# Patient Record
Sex: MALE | Race: WHITE | NOT HISPANIC OR LATINO | Employment: FULL TIME | URBAN - METROPOLITAN AREA
[De-identification: names, ages, dates, MRNs, and addresses within clinical notes are randomized per-mention and may not be internally consistent; named-entity substitution may affect disease eponyms.]

---

## 2017-08-31 ENCOUNTER — TRANSFERRED RECORDS (OUTPATIENT)
Dept: HEALTH INFORMATION MANAGEMENT | Facility: CLINIC | Age: 37
End: 2017-08-31

## 2017-08-31 LAB
CREAT SERPL-MCNC: 1.1 MG/DL (ref 0.6–1.3)
GLUCOSE SERPL-MCNC: 124 MG/DL (ref 60–115)
POTASSIUM SERPL-SCNC: 3.8 MMOL/L (ref 3.5–4.9)

## 2019-01-15 ENCOUNTER — OFFICE VISIT (OUTPATIENT)
Dept: FAMILY MEDICINE | Facility: CLINIC | Age: 39
End: 2019-01-15
Payer: COMMERCIAL

## 2019-01-15 VITALS
HEART RATE: 78 BPM | OXYGEN SATURATION: 96 % | TEMPERATURE: 98.2 F | BODY MASS INDEX: 37.18 KG/M2 | RESPIRATION RATE: 16 BRPM | WEIGHT: 251 LBS | HEIGHT: 69 IN | DIASTOLIC BLOOD PRESSURE: 88 MMHG | SYSTOLIC BLOOD PRESSURE: 126 MMHG

## 2019-01-15 DIAGNOSIS — J45.40 MODERATE PERSISTENT ASTHMA WITHOUT COMPLICATION: Primary | ICD-10-CM

## 2019-01-15 PROCEDURE — 99203 OFFICE O/P NEW LOW 30 MIN: CPT | Performed by: PHYSICIAN ASSISTANT

## 2019-01-15 RX ORDER — LEVALBUTEROL TARTRATE 45 UG/1
2 AEROSOL, METERED ORAL EVERY 4 HOURS PRN
COMMUNITY
End: 2022-04-19

## 2019-01-15 RX ORDER — ALBUTEROL SULFATE 90 UG/1
1-2 AEROSOL, METERED RESPIRATORY (INHALATION) EVERY 4 HOURS PRN
Qty: 1 INHALER | Refills: 3 | Status: SHIPPED | OUTPATIENT
Start: 2019-01-15 | End: 2019-03-01

## 2019-01-15 ASSESSMENT — MIFFLIN-ST. JEOR: SCORE: 2038.91

## 2019-01-15 NOTE — LETTER
My Asthma Action Plan  Name: Mario Delgado   YOB: 1980  Date: 1/15/2019   My doctor: Mira Arboleda PA-C   My clinic: [facility]        My Control Medicine:  My Rescue Medicine:    My Asthma Severity:   Avoid your asthma triggers:                GREEN ZONE   Good Control    I feel good    No cough or wheeze    Can work, sleep and play without asthma symptoms       Take your asthma control medicine every day.     1. If exercise triggers your asthma, take your rescue medication    15 minutes before exercise or sports, and    During exercise if you have asthma symptoms  2. Spacer to use with inhaler: If you have a spacer, make sure to use it with your inhaler             YELLOW ZONE Getting Worse  I have ANY of these:    I do not feel good    Cough or wheeze    Chest feels tight    Wake up at night   1. Keep taking your Green Zone medications  2. Start taking your rescue medicine:    every 20 minutes for up to 1 hour. Then every 4 hours for 24-48 hours.  3. If you stay in the Yellow Zone for more than 12-24 hours, contact your doctor.  4. If you do not return to the Green Zone in 12-24 hours or you get worse, start taking your oral steroid medicine if prescribed by your provider.           RED ZONE Medical Alert - Get Help  I have ANY of these:    I feel awful    Medicine is not helping    Breathing getting harder    Trouble walking or talking    Nose opens wide to breathe       1. Take your rescue medicine NOW  2. If your provider has prescribed an oral steroid medicine, start taking it NOW  3. Call your doctor NOW  4. If you are still in the Red Zone after 20 minutes and you have not reached your doctor:    Take your rescue medicine again and    Call 911 or go to the emergency room right away    See your regular doctor within 2 weeks of an Emergency Room or Urgent Care visit for follow-up treatment.          Annual Reminders:  Meet with Asthma Educator,  Flu Shot in the Fall,  consider Pneumonia Vaccination for patients with asthma (aged 19 and older).    Pharmacy:    [facility] [address] [place], [place] - [address] AT [facility] [address]  [facility] [address] [place], [place] - [address] AT [facility] [address]                      Asthma Triggers  How To Control Things That Make Your Asthma Worse    Triggers are things that make your asthma worse.  Look at the list below to help you find your triggers and what you can do about them.  You can help prevent asthma flare-ups by staying away from your triggers.      Trigger                                                          What you can do   Cigarette Smoke  Tobacco smoke can make asthma worse. Do not allow smoking in your home, car or around you.  Be sure no one smokes at a child s day care or school.  If you smoke, ask your health care provider for ways to help you quit.  Ask family members to quit too.  Ask your health care provider for a referral to Quit Plan to help you quit smoking, or call 2-833-420-PLAN.     Colds, Flu, Bronchitis  These are common triggers of asthma. Wash your hands often.  Don t touch your eyes, nose or mouth.  Get a flu shot every year.     Dust Mites  These are tiny bugs that live in cloth or carpet. They are too small to see. Wash sheets and blankets in hot water every week.   Encase pillows and mattress in dust mite proof covers.  Avoid having carpet if you can. If you have carpet, vacuum weekly.   Use a dust mask and HEPA vacuum.   Pollen and Outdoor Mold  Some people are allergic to trees, grass, or weed pollen, or molds. Try to keep your windows closed.  Limit time out doors when pollen count is high.   Ask you health care provider about taking medicine during allergy season.     Animal Dander  Some people are allergic to skin flakes, urine or saliva from pets with fur or feathers. Keep pets with fur or feathers out of your home.    If you can t keep the pet  outdoors, then keep the pet out of your bedroom.  Keep the bedroom door closed.  Keep pets off cloth furniture and away from stuffed toys.     Mice, Rats, and Cockroaches  Some people are allergic to the waste from these pests.   Cover food and garbage.  Clean up spills and food crumbs.  Store grease in the refrigerator.   Keep food out of the bedroom.   Indoor Mold  This can be a trigger if your home has high moisture. Fix leaking faucets, pipes, or other sources of water.   Clean moldy surfaces.  Dehumidify basement if it is damp and smelly.   Smoke, Strong Odors, and Sprays  These can reduce air quality. Stay away from strong odors and sprays, such as perfume, powder, hair spray, paints, smoke incense, paint, cleaning products, candles and new carpet.   Exercise or Sports  Some people with asthma have this trigger. Be active!  Ask your doctor about taking medicine before sports or exercise to prevent symptoms.    Warm up for 5-10 minutes before and after sports or exercise.     Other Triggers of Asthma  Cold air:  Cover your nose and mouth with a scarf.  Sometimes laughing or crying can be a trigger.  Some medicines and food can trigger asthma.

## 2019-01-16 ASSESSMENT — ASTHMA QUESTIONNAIRES: ACT_TOTALSCORE: 12

## 2019-02-27 ENCOUNTER — MYC MEDICAL ADVICE (OUTPATIENT)
Dept: FAMILY MEDICINE | Facility: CLINIC | Age: 39
End: 2019-02-27

## 2019-02-27 DIAGNOSIS — J45.40 MODERATE PERSISTENT ASTHMA WITHOUT COMPLICATION: ICD-10-CM

## 2019-02-28 NOTE — TELEPHONE ENCOUNTER
Disp Refills Start End TANI   albuterol (PROAIR HFA/PROVENTIL HFA/VENTOLIN HFA) 108 (90 Base) MCG/ACT inhaler 1 Inhaler 3 1/15/2019 1/15/2020 --   Sig - Route: Inhale 1-2 puffs into the lungs every 4 hours as needed for shortness of breath / dyspnea or wheezing - Inhalation

## 2019-03-01 RX ORDER — ALBUTEROL SULFATE 90 UG/1
1-2 AEROSOL, METERED RESPIRATORY (INHALATION) EVERY 4 HOURS PRN
Qty: 2 INHALER | Refills: 3 | Status: SHIPPED | OUTPATIENT
Start: 2019-03-01 | End: 2020-03-28

## 2019-03-01 NOTE — TELEPHONE ENCOUNTER
Pt is requesting quantity change from one to two albuterol inhalers per month.  Forwarded to PCP.   Carlos A Martin RN

## 2019-09-30 ENCOUNTER — HEALTH MAINTENANCE LETTER (OUTPATIENT)
Age: 39
End: 2019-09-30

## 2020-03-27 DIAGNOSIS — J45.40 MODERATE PERSISTENT ASTHMA WITHOUT COMPLICATION: ICD-10-CM

## 2020-03-28 NOTE — TELEPHONE ENCOUNTER
Routing refill request to provider for review/approval because:  Patient needs to be seen because it has been more than 1 year since last office visit 1/15/2019  ACT not up to date     Pending Prescriptions:                       Disp   Refills    VENTOLIN  (90 Base) MCG/ACT inhale*36 g                Sig: INHALE 1 TO 2 PUFFS INTO THE LUNGS EVERY 4 HOURS           AS NEEDED FOR SHORTNESS OF BREATH OR DIFFICULT           BREATHING OR WHEEZING      Shaylee Diaz, Registered Nurse   [facility]

## 2020-03-30 RX ORDER — ALBUTEROL SULFATE 90 UG/1
AEROSOL, METERED RESPIRATORY (INHALATION)
Qty: 18 G | Refills: 0 | Status: SHIPPED | OUTPATIENT
Start: 2020-03-30 | End: 2020-05-07

## 2020-05-05 DIAGNOSIS — J45.40 MODERATE PERSISTENT ASTHMA WITHOUT COMPLICATION: ICD-10-CM

## 2020-05-05 RX ORDER — ALBUTEROL SULFATE 90 UG/1
AEROSOL, METERED RESPIRATORY (INHALATION)
Qty: 18 G | Refills: 0 | Status: CANCELLED | OUTPATIENT
Start: 2020-05-05

## 2020-05-07 RX ORDER — ALBUTEROL SULFATE 90 UG/1
AEROSOL, METERED RESPIRATORY (INHALATION)
Qty: 18 G | Refills: 0 | Status: SHIPPED | OUTPATIENT
Start: 2020-05-07 | End: 2020-05-12

## 2020-05-07 NOTE — TELEPHONE ENCOUNTER
Called pt-lm to call clinic, video/phone visit needed for med ck with Mira Arboleda.    Yola Ferrer/MARTINA

## 2020-05-07 NOTE — TELEPHONE ENCOUNTER
Routing refill request to provider for review/approval because:  Abnormal ACT    Ann Marie Shore RN on 5/7/2020 at 10:12 AM

## 2020-05-12 ENCOUNTER — VIRTUAL VISIT (OUTPATIENT)
Dept: FAMILY MEDICINE | Facility: CLINIC | Age: 40
End: 2020-05-12
Payer: COMMERCIAL

## 2020-05-12 ENCOUNTER — TELEPHONE (OUTPATIENT)
Dept: FAMILY MEDICINE | Facility: CLINIC | Age: 40
End: 2020-05-12

## 2020-05-12 DIAGNOSIS — J45.40 MODERATE PERSISTENT ASTHMA WITHOUT COMPLICATION: ICD-10-CM

## 2020-05-12 PROCEDURE — 99213 OFFICE O/P EST LOW 20 MIN: CPT | Mod: GT | Performed by: PHYSICIAN ASSISTANT

## 2020-05-12 RX ORDER — ALBUTEROL SULFATE 90 UG/1
AEROSOL, METERED RESPIRATORY (INHALATION)
Qty: 54 G | Refills: 1 | Status: SHIPPED | OUTPATIENT
Start: 2020-05-12 | End: 2020-11-16

## 2020-05-12 ASSESSMENT — ASTHMA QUESTIONNAIRES
QUESTION_4 LAST FOUR WEEKS HOW OFTEN HAVE YOU USED YOUR RESCUE INHALER OR NEBULIZER MEDICATION (SUCH AS ALBUTEROL): THREE OR MORE TIMES PER DAY
QUESTION_3 LAST FOUR WEEKS HOW OFTEN DID YOUR ASTHMA SYMPTOMS (WHEEZING, COUGHING, SHORTNESS OF BREATH, CHEST TIGHTNESS OR PAIN) WAKE YOU UP AT NIGHT OR EARLIER THAN USUAL IN THE MORNING: ONCE OR TWICE
QUESTION_2 LAST FOUR WEEKS HOW OFTEN HAVE YOU HAD SHORTNESS OF BREATH: MORE THAN ONCE A DAY
ACT_TOTALSCORE: 12
QUESTION_5 LAST FOUR WEEKS HOW WOULD YOU RATE YOUR ASTHMA CONTROL: SOMEWHAT CONTROLLED
QUESTION_1 LAST FOUR WEEKS HOW MUCH OF THE TIME DID YOUR ASTHMA KEEP YOU FROM GETTING AS MUCH DONE AT WORK, SCHOOL OR AT HOME: SOME OF THE TIME

## 2020-05-12 NOTE — PROGRESS NOTES
"Mario Delgado is a 39 year old male who is being evaluated via a billable video visit.      The patient has been notified of following:     \"This video visit will be conducted via a call between you and your physician/provider. We have found that certain health care needs can be provided without the need for an in-person physical exam.  This service lets us provide the care you need with a video conversation.  If a prescription is necessary we can send it directly to your pharmacy.  If lab work is needed we can place an order for that and you can then stop by our lab to have the test done at a later time.    Video visits are billed at different rates depending on your insurance coverage.  Please reach out to your insurance provider with any questions.    If during the course of the call the physician/provider feels a video visit is not appropriate, you will not be charged for this service.\"    Patient has given verbal consent for Video visit? Yes    How would you like to obtain your AVS? [facility]    Patient would like the video invitation sent by: Text to cell phone: [phone]    Will anyone else be joining your video visit? No      Subjective     Mario Delgado is a 39 year old male who presents today via video visit for the following health issues:    HPI  Asthma Follow-Up  Patient is not using Advair due to cost.    Was ACT completed today?    Yes    ACT Total Scores 5/12/2020   ACT TOTAL SCORE -   ASTHMA ER VISITS -   ASTHMA HOSPITALIZATIONS -   ACT TOTAL SCORE (Goal Greater than or Equal to 20) 12   In the past 12 months, how many times did you visit the emergency room for your asthma without being admitted to the hospital? 0   In the past 12 months, how many times were you hospitalized overnight because of your asthma? 0       How many days per week do you miss taking your asthma controller medication?  0    Please describe any recent triggers for your asthma: pollens, animal dander and saw dust    Have " "you had any Emergency Room Visits, Urgent Care Visits, or Hospital Admissions since your last office visit?  No      How many servings of fruits and vegetables do you eat daily?  0-1    On average, how many sweetened beverages do you drink each day (Examples: soda, juice, sweet tea, etc.  Do NOT count diet or artificially sweetened beverages)?   2    How many days per week do you exercise enough to make your heart beat faster? 3 or less    How many minutes a day do you exercise enough to make your heart beat faster? 30 - 60    How many days per week do you miss taking your medication? 0         Video Start Time: 1103        Patient Active Problem List   Diagnosis     CARDIOVASCULAR SCREENING; LDL GOAL LESS THAN 160     Seasonal allergic rhinitis     Moderate persistent asthma     Past Surgical History:   Procedure Laterality Date     ENT SURGERY      Nasal Fracture     NO HISTORY OF SURGERY         Social History     Tobacco Use     Smoking status: Never Smoker     Smokeless tobacco: Current User     Types: Chew   Substance Use Topics     Alcohol use: Yes     Comment: 3-4 drinks 1-2x/month     Family History   Problem Relation Age of Onset     Asthma Mother      Family History Negative No family hx of            Reviewed and updated as needed this visit by Provider         Review of Systems   Constitutional, HEENT, cardiovascular, pulmonary, gi and gu systems are negative, except as otherwise noted.      Objective    There were no vitals taken for this visit.  Estimated body mass index is 37.07 kg/m  as calculated from the following:    Height as of 1/15/19: 1.753 m (5' 9\").    Weight as of 1/15/19: 113.9 kg (251 lb).  Physical Exam     GENERAL: Healthy, alert and no distress  EYES: Eyes grossly normal to inspection.  No discharge or erythema, or obvious scleral/conjunctival abnormalities.  RESP: No audible wheeze, cough, or visible cyanosis.  No visible retractions or increased work of breathing.    SKIN: Visible " skin clear. No significant rash, abnormal pigmentation or lesions.  NEURO: Cranial nerves grossly intact.  Mentation and speech appropriate for age.  PSYCH: Mentation appears normal, affect normal/bright, judgement and insight intact, normal speech and appearance well-groomed.              Assessment & Plan     1. Moderate persistent asthma without complication  Poorly controlled. Recommend restart Advair.  Discussed [facility] for cost. Pt will look into this.     - fluticasone-salmeterol (ADVAIR DISKUS) 500-50 MCG/DOSE inhaler; Inhale 1 puff into the lungs 2 times daily  Dispense: 3 Inhaler; Refill: 3  - albuterol (PROAIR HFA/PROVENTIL HFA/VENTOLIN HFA) 108 (90 Base) MCG/ACT inhaler; INHALE 3 PUFFS INTO THE LUNGS EVERY 4 HOURS AS NEEDED FOR SHORTNESS OF BREATH/DYSPNEA OR WHEEZING  Dispense: 54 g; Refill: 1     Tobacco Cessation:   reports that he has never smoked. His smokeless tobacco use includes chew.  Tobacco Cessation Action Plan: Information offered: Patient not interested at this time        See Patient Instructions    No follow-ups on file.    Mira Arboleda PA-C  [facility]      Video-Visit Details    Type of service:  Video Visit    Video End Time:1115    Originating Location (pt. Location): Home    Distant Location (provider location):  [facility]     Platform used for Video Visit: IQMax    No follow-ups on file.       Mira Arboleda PA-C

## 2020-05-13 ASSESSMENT — ASTHMA QUESTIONNAIRES: ACT_TOTALSCORE: 12

## 2020-10-18 ENCOUNTER — OFFICE VISIT (OUTPATIENT)
Dept: URGENT CARE | Facility: URGENT CARE | Age: 40
End: 2020-10-18
Payer: COMMERCIAL

## 2020-10-18 ENCOUNTER — ANCILLARY PROCEDURE (OUTPATIENT)
Dept: GENERAL RADIOLOGY | Facility: CLINIC | Age: 40
End: 2020-10-18
Attending: FAMILY MEDICINE
Payer: COMMERCIAL

## 2020-10-18 VITALS
RESPIRATION RATE: 16 BRPM | DIASTOLIC BLOOD PRESSURE: 78 MMHG | TEMPERATURE: 99.1 F | HEART RATE: 104 BPM | SYSTOLIC BLOOD PRESSURE: 110 MMHG | OXYGEN SATURATION: 96 %

## 2020-10-18 DIAGNOSIS — R07.9 CHEST PAIN, UNSPECIFIED TYPE: Primary | ICD-10-CM

## 2020-10-18 LAB
BASOPHILS # BLD AUTO: 0 10E9/L (ref 0–0.2)
BASOPHILS NFR BLD AUTO: 0.2 %
DIFFERENTIAL METHOD BLD: NORMAL
EOSINOPHIL # BLD AUTO: 0 10E9/L (ref 0–0.7)
EOSINOPHIL NFR BLD AUTO: 0 %
ERYTHROCYTE [DISTWIDTH] IN BLOOD BY AUTOMATED COUNT: 13.2 % (ref 10–15)
HCT VFR BLD AUTO: 46.2 % (ref 40–53)
HGB BLD-MCNC: 15.8 G/DL (ref 13.3–17.7)
LYMPHOCYTES # BLD AUTO: 1 10E9/L (ref 0.8–5.3)
LYMPHOCYTES NFR BLD AUTO: 21.2 %
MCH RBC QN AUTO: 30.9 PG (ref 26.5–33)
MCHC RBC AUTO-ENTMCNC: 34.2 G/DL (ref 31.5–36.5)
MCV RBC AUTO: 90 FL (ref 78–100)
MONOCYTES # BLD AUTO: 0.7 10E9/L (ref 0–1.3)
MONOCYTES NFR BLD AUTO: 14.4 %
NEUTROPHILS # BLD AUTO: 3.1 10E9/L (ref 1.6–8.3)
NEUTROPHILS NFR BLD AUTO: 64.2 %
PLATELET # BLD AUTO: 187 10E9/L (ref 150–450)
RBC # BLD AUTO: 5.12 10E12/L (ref 4.4–5.9)
WBC # BLD AUTO: 4.9 10E9/L (ref 4–11)

## 2020-10-18 PROCEDURE — 93000 ELECTROCARDIOGRAM COMPLETE: CPT | Performed by: FAMILY MEDICINE

## 2020-10-18 PROCEDURE — 71046 X-RAY EXAM CHEST 2 VIEWS: CPT | Performed by: RADIOLOGY

## 2020-10-18 PROCEDURE — 99214 OFFICE O/P EST MOD 30 MIN: CPT | Performed by: FAMILY MEDICINE

## 2020-10-18 PROCEDURE — 36415 COLL VENOUS BLD VENIPUNCTURE: CPT | Performed by: FAMILY MEDICINE

## 2020-10-18 PROCEDURE — 85025 COMPLETE CBC W/AUTO DIFF WBC: CPT | Performed by: FAMILY MEDICINE

## 2020-10-18 NOTE — PROGRESS NOTES
SUBJECTIVE:   Mario Delgado is a 40 year old male presenting with a chief complaint of   Chief Complaint   Patient presents with     Urgent Care     Chest Pain     Left side of chest-Pain-started last week on and off-Pain radiates into upper back by shoulder blades   Big party weekend. He is concerned about his heart . Had a relative die of a heart attack and so he is very concerned about his heart.  He is having some chest pain.  This chest pain is in the middle of his chest without radiation.  It without any evidence of shortness of breath.  It does come with deep inspiration.    He has no signs or symptoms of CHF.      He is an established patient of [facility].        Review of Systems   Respiratory:        ;l;[facility]   Cardiovascular: Positive for chest pain.   All other systems reviewed and are negative.      Past Medical History:   Diagnosis Date     Mild intermittent asthma      Family History   Problem Relation Age of Onset     Asthma Mother      Family History Negative No family hx of      Current Outpatient Medications   Medication Sig Dispense Refill     albuterol (PROAIR HFA/PROVENTIL HFA/VENTOLIN HFA) 108 (90 Base) MCG/ACT inhaler INHALE 3 PUFFS INTO THE LUNGS EVERY 4 HOURS AS NEEDED FOR SHORTNESS OF BREATH/DYSPNEA OR WHEEZING 54 g 1     fluticasone-salmeterol (ADVAIR DISKUS) 500-50 MCG/DOSE inhaler Inhale 1 puff into the lungs 2 times daily 3 Inhaler 3     levalbuterol (XOPENEX HFA) 45 MCG/ACT inhaler Inhale 2 puffs into the lungs every 4 hours as needed for shortness of breath / dyspnea or wheezing       Social History     Tobacco Use     Smoking status: Never Smoker     Smokeless tobacco: Current User     Types: Chew   Substance Use Topics     Alcohol use: Yes     Comment: 3-4 drinks 1-2x/month       OBJECTIVE  /78 (BP Location: Right arm, Patient Position: Sitting, Cuff Size: Adult Large)   Pulse 104   Temp 99.1  F (37.3  C) (Tympanic)   Resp 16   SpO2 96%      Physical Exam  Vitals signs and nursing note reviewed.   HENT:      Head: Normocephalic.   Eyes:      Extraocular Movements: Extraocular movements intact.      Pupils: Pupils are equal, round, and reactive to light.   Neck:      Musculoskeletal: Normal range of motion.   Cardiovascular:      Rate and Rhythm: Normal rate.      Comments: There is tenderness to palpation of the left sternal border.    In addition complaints of pain with deep inspiration  Pulmonary:      Effort: Pulmonary effort is normal.   Abdominal:      Palpations: Abdomen is soft.   Musculoskeletal: Normal range of motion.   Skin:     General: Skin is warm.   Neurological:      General: No focal deficit present.      Mental Status: He is alert.   Psychiatric:         Mood and Affect: Mood normal.         Labs:  No results found for this or any previous visit (from the past 24 hour(s)).    X-Ray was done, my findings are: The x-ray did not show any evidence of infiltrate and showed no cardiomegaly     ASSESSMENT:      ICD-10-CM    1. Chest pain, unspecified type  R07.9 CBC with platelets and differential     XR Chest 2 Views     EKG 12-lead complete w/read - Clinics      His chest pain is costochondritis.  His EKG showed no acute changes.  In addition his scenario is more that of an etiology that is noncardiac.. We did not check troponins.    Did however check an x-ray and this did not show any evidence of cardiomegaly this did not show any evidence of infiltrate.    His physical examination was consistent with pain along the sternal border to palpation.  This I think accounts for his chest pain as well as his pain with deep inspiration.    This is probably viral.  This should be self-limited  Medical Decision Making:    Differential Diagnosis:  Chest pain, costochondritis, MI, viral infection, arthritis,    Serious Comorbid Conditions:  Adult:  None    PLAN:    1.  Anti-inflammatory medication  2.  Ice to the area    Followup:    If not  improving or if condition worsens, follow up with your Primary Care Provider    There are no Patient Instructions on file for this visit.

## 2020-11-15 DIAGNOSIS — J45.40 MODERATE PERSISTENT ASTHMA WITHOUT COMPLICATION: ICD-10-CM

## 2020-11-16 NOTE — TELEPHONE ENCOUNTER
Routing refill request to provider for review/approval because:  Patient needs to be seen because:  Med check  Failing ACT    Thalia Ivory RN

## 2020-11-17 RX ORDER — ALBUTEROL SULFATE 90 UG/1
AEROSOL, METERED RESPIRATORY (INHALATION)
Qty: 54 G | Refills: 0 | Status: SHIPPED | OUTPATIENT
Start: 2020-11-17 | End: 2021-01-14

## 2021-01-13 DIAGNOSIS — J45.40 MODERATE PERSISTENT ASTHMA WITHOUT COMPLICATION: ICD-10-CM

## 2021-01-14 RX ORDER — ALBUTEROL SULFATE 90 UG/1
AEROSOL, METERED RESPIRATORY (INHALATION)
Qty: 54 G | Refills: 0 | Status: SHIPPED | OUTPATIENT
Start: 2021-01-14 | End: 2021-02-23

## 2021-01-14 NOTE — TELEPHONE ENCOUNTER
Medication is being filled for 1 time refill only due to:  Patient needs to be seen because asthma visit due.  Prescription approved per [facility] Refill Protocol.  Routed to  for scheduling  Sudha Sutton RN, BSN  Message handled by CLINIC NURSE.

## 2021-01-15 ENCOUNTER — HEALTH MAINTENANCE LETTER (OUTPATIENT)
Age: 41
End: 2021-01-15

## 2021-02-22 NOTE — PROGRESS NOTES
Pre-Visit Planning     Future Appointments   Date Time Provider Department Center   2/23/2021  8:20 AM Mira Arboleda PA-C [facility]     Arrival Time for this Appointment:  8:05 AM   Appointment Notes for this encounter:   [phone]- med review    Questionnaires Reviewed/Assigned  No additional questionnaires are needed    Last OV with provider  5/12/21asthma increased albuterol inh     Hospital ER Visits  none    Specialty Visits  [facility] on 10/18/20 viral inf    Imaging and Lab Review  Chest xray on 10/18/20    Health Maintenance Due   Topic Date Due     PREVENTIVE CARE VISIT  1980     ANNUAL REVIEW OF HM ORDERS  1980     ADVANCE CARE PLANNING  1980     Pneumococcal Vaccine: Pediatrics (0 to 5 Years) and At-Risk Patients (6 to 64 Years) (1 of 2 - PPSV23) 05/20/1984     HIV SCREENING  05/20/1993     HEPATITIS C SCREENING  05/20/1996     LIPID  05/20/2013     ASTHMA ACTION PLAN  01/15/2020     INFLUENZA VACCINE (1) 09/01/2020     ASTHMA CONTROL TEST  11/12/2020     Current Outpatient Medications   Medication     albuterol (VENTOLIN HFA) 108 (90 Base) MCG/ACT inhaler     fluticasone-salmeterol (ADVAIR DISKUS) 500-50 MCG/DOSE inhaler     levalbuterol (XOPENEX HFA) 45 MCG/ACT inhaler     nabumetone (RELAFEN) 750 MG tablet     No current facility-administered medications for this visit.        Refills due?    Patient is active on MyChart. does not always read    Patient preferred phone number: [phone]  Alyse Murillo RN

## 2021-02-23 ENCOUNTER — VIRTUAL VISIT (OUTPATIENT)
Dept: FAMILY MEDICINE | Facility: CLINIC | Age: 41
End: 2021-02-23
Payer: COMMERCIAL

## 2021-02-23 DIAGNOSIS — G47.09 OTHER INSOMNIA: ICD-10-CM

## 2021-02-23 DIAGNOSIS — J45.40 MODERATE PERSISTENT ASTHMA WITHOUT COMPLICATION: Primary | ICD-10-CM

## 2021-02-23 DIAGNOSIS — R06.83 SNORING: ICD-10-CM

## 2021-02-23 PROCEDURE — 99214 OFFICE O/P EST MOD 30 MIN: CPT | Mod: TEL | Performed by: PHYSICIAN ASSISTANT

## 2021-02-23 RX ORDER — MONTELUKAST SODIUM 10 MG/1
10 TABLET ORAL AT BEDTIME
Qty: 90 TABLET | Refills: 1 | Status: SHIPPED | OUTPATIENT
Start: 2021-02-23 | End: 2022-04-19

## 2021-02-23 RX ORDER — ALBUTEROL SULFATE 90 UG/1
AEROSOL, METERED RESPIRATORY (INHALATION)
Qty: 2 INHALER | Refills: 3 | Status: SHIPPED | OUTPATIENT
Start: 2021-02-23 | End: 2021-08-02

## 2021-02-23 RX ORDER — TRAZODONE HYDROCHLORIDE 50 MG/1
50-100 TABLET, FILM COATED ORAL AT BEDTIME
Qty: 60 TABLET | Refills: 3 | Status: SHIPPED | OUTPATIENT
Start: 2021-02-23 | End: 2022-04-19

## 2021-02-23 NOTE — PROGRESS NOTES
Simba is a 40 year old who is being evaluated via a billable telephone visit.      What phone number would you like to be contacted at? [phone]  How would you like to obtain your AVS? MyChart    Assessment & Plan     Moderate persistent asthma without complication  Using albuterol several times per day.   Using Advair daily, as directed.  Will add Singulair    - montelukast (SINGULAIR) 10 MG tablet; Take 1 tablet (10 mg) by mouth At Bedtime  - fluticasone-salmeterol (ADVAIR DISKUS) 500-50 MCG/DOSE inhaler; Inhale 1 puff into the lungs 2 times daily  - albuterol (VENTOLIN HFA) 108 (90 Base) MCG/ACT inhaler; INHALE 3 PUFFS INTO THE LUNGS EVERY 4 HOURS AS NEEDED FOR SHORTNESS OF BREATH OR DIFFICULT BREATHING OR WHEEZING    Snoring  Patient with loud snoring and excessive daytime drowsiness.   Working long hours as well.  suspicious for CASH  - SLEEP EVALUATION & MANAGEMENT REFERRAL - [facility]  [phone] (Age 18 and up); Future    Other insomnia  Working long hours, cannot get to sleep early enough.  Risks/benefits of medication use discussed with patient. Patient reports understanding and accepts trial of medication.    - traZODone (DESYREL) 50 MG tablet; Take 1-2 tablets ( mg) by mouth At Bedtime             See Patient Instructions    No follow-ups on file.    ELOINA [facility]   Simba is a 40 year old who presents for the following health issues     History of Present Illness     Asthma:  He presents for follow up of asthma.  He has no cough, no wheezing, and some shortness of breath. He is using a relief medication every 4 hours. He does not miss any doses of his controller medication throughout the week.Patient is aware of the following triggers: same as previous visit, cold air and exercise or sports. The patient has had a visit to the Emergency Room, Urgent Care or Hospital due to asthma since the last clinic  visit. He has been to the Emergency Room or Urgent Care 0 times.He has had a Hospitalization 0 times.He consumes 3 sweetened beverage(s) daily.He exercises with enough effort to increase his heart rate 20 to 29 minutes per day.  He exercises with enough effort to increase his heart rate 5 days per week.   He is taking medications regularly.         Insomnia  Onset/Duration: several months   Description:   Frequency of insomnia:  nightly  Time to fall asleep (sleep latency): 2 hours  Middle of night awakening:  no  Early morning awakening:  Yes, but gets up at 330 for job  Progression of Symptoms:  worsening  Accompanying Signs & Symptoms:  Daytime sleepiness/napping: YES  Excessive snoring/apnea: YES  Restless legs: no  Waking to urinate: no  Chronic pain:  no  Depression symptoms (if yes, do PHQ9): no  Anxiety symptoms (if yes, do ZENY-7): no  History:  Prior Insomnia: no  New stressful situation: YES  Precipitating factors:   Caffeine intake: YES  OTC decongestants: no  Any new medications: no  Alleviating factors:  Self medicating (alcohol, etc.):  no  Stress-reduction (exercise, yoga, meditation etc): Gladys'  Therapies tried and outcome: caffeine avoidance          Review of Systems   Constitutional, HEENT, cardiovascular, pulmonary, gi and gu systems are negative, except as otherwise noted.      Objective           Vitals:  No vitals were obtained today due to virtual visit.    Physical Exam   healthy, alert and no distress  PSYCH: Alert and oriented times 3; coherent speech, normal   rate and volume, able to articulate logical thoughts, able   to abstract reason, no tangential thoughts, no hallucinations   or delusions  His affect is normal and pleasant  RESP: No cough, no audible wheezing, able to talk in full sentences  Remainder of exam unable to be completed due to telephone visits                Phone call duration: 20 minutes

## 2021-02-24 ASSESSMENT — ASTHMA QUESTIONNAIRES: ACT_TOTALSCORE: 13

## 2021-03-01 ENCOUNTER — MYC MEDICAL ADVICE (OUTPATIENT)
Dept: FAMILY MEDICINE | Facility: CLINIC | Age: 41
End: 2021-03-01

## 2021-03-01 NOTE — TELEPHONE ENCOUNTER
"See [employer] message, searched, cannot find any PA for albuterol for pt, called [facility], confirmed receipt, \"our fault\", \"I'll fix it\", no action needed from us, sent Recurveestefanyt response, FYI to GIA Sutton RN, BSN  Message handled by CLINIC NURSE.    "

## 2021-03-18 NOTE — PROGRESS NOTES
"Simba is a 40 year old who is being evaluated via a billable video visit.      How would you like to obtain your AVS? MyChart  If the video visit is dropped, the invitation should be resent by: Text to cell phone: [phone]  Will anyone else be joining your video visit? No   Rosibel Monroy CMA  {If patient encounters technical issues they should call [phone] :[id number]}    Video Start Time: {video visit start/end time for provider to select:152948}  Video-Visit Details    Type of service:  Video Visit    Video End Time:{video visit start/end time for provider to select:152948}    Originating Location (pt. Location): {video visit patient location:703232::\"Home\"}    Distant Location (provider location):  [facility]     Platform used for Video Visit: {Virtual Visit Platforms:783452::\"iubenda\"}  "

## 2021-03-29 ENCOUNTER — MYC MEDICAL ADVICE (OUTPATIENT)
Dept: FAMILY MEDICINE | Facility: CLINIC | Age: 41
End: 2021-03-29

## 2021-03-29 VITALS — HEIGHT: 69 IN | WEIGHT: 235 LBS | BODY MASS INDEX: 34.8 KG/M2

## 2021-03-29 ASSESSMENT — MIFFLIN-ST. JEOR: SCORE: 1956.33

## 2021-03-29 NOTE — PATIENT INSTRUCTIONS
"MY TREATMENT INFORMATION FOR SLEEP APNEA-  Mario Delgado    DOCTOR : Valeria Álvarez PA-C    Am I having a home sleep study?  --->Watch the video for the device you are using:    /drop off device-   https://www.MedHab.com/watch?v=yGGFBdELGhk      Frequently asked questions:  1. What is Obstructive Sleep Apnea (CASH)? CASH is the most common type of sleep apnea. Apnea means, \"without breath.\"  Apnea is most often caused by narrowing or collapse of the upper airway as muscles relax during sleep.   Almost everyone has occasional apneas. Most people with sleep apnea have had brief interruptions at night frequently for many years.  The severity of sleep apnea is related to how frequent and severe the events are.   2. What are the consequences of CASH? Symptoms include: feeling sleepy during the day, snoring loudly, gasping or stopping of breathing, trouble sleeping, and occasionally morning headaches or heartburn at night.  Sleepiness can be serious and even increase the risk of falling asleep while driving. Other health consequences may include development of high blood pressure and other cardiovascular disease in persons who are susceptible. Untreated CASH  can contribute to heart disease, stroke and diabetes.   3. What are the treatment options? In most situations, sleep apnea is a lifelong disease that must be managed with daily therapy. Medications are not effective for sleep apnea and surgery is generally not considered until other therapies have been tried. Your treatment is your choice . Continuous Positive Airway (CPAP) works right away and is the therapy that is effective in nearly everyone. An oral device to hold your jaw forward is usually the next most reliable option. Other options include postioning devices (to keep you off your back), weight loss, and surgery including a tongue pacing device. There is more detail about some of these options below.  4. Are my sleep studies covered by insurance? " Although we will request verification of coverage, we advise you also check in advance of the study to ensure there is coverage.    Important tips for those choosing CPAP and similar devices   Know your equipment:  CPAP is continuous positive airway pressure that prevents obstructive sleep apnea by keeping the throat from collapsing while you are sleeping. In most cases, the device is  smart  and can slowly self-adjusts if your throat collapses and keeps a record every day of how well you are treated-this information is available to you and your care team.  BPAP is bilevel positive airway pressure that keeps your throat open and also assists each breath with a pressure boost to maintain adequate breathing.  Special kinds of BPAP are used in patients who have inadequate breathing from lung or heart disease. In most cases, the device is  smart  and can slowly self-adjusts to assist breathing. Like CPAP, the device keeps a record of how well you are treated.  Your mask is your connection to the device. You get to choose what feels most comfortable and the staff will help to make sure if fits. Here: are some examples of the different masks that are available:       Key points to remember on your journey with sleep apnea:  1. Sleep study.  PAP devices often need to be adjusted during a sleep study to show that they are effective and adjusted right.  2. Good tips to remember: Try wearing just the mask during a quiet time during the day so your body adapts to wearing it. A humidifier is recommended for comfort in most cases to prevent drying of your nose and throat. Allergy medication from your provider may help you if you are having nasal congestion.  3. Getting settled-in. It takes more than one night for most of us to get used to wearing a mask. Try wearing just the mask during a quiet time during the day so your body adapts to wearing it. A humidifier is recommended for comfort in most cases. Our team will work with  you carefully on the first day and will be in contact within 4 days and again at 2 and 4 weeks for advice and remote device adjustments. Your therapy is evaluated by the device each day.   4. Use it every night. The more you are able to sleep naturally for 7-8 hours, the more likely you will have good sleep and to prevent health risks or symptoms from sleep apnea. Even if you use it 4 hours it helps. Occasionally all of us are unable to use a medical therapy, in sleep apnea, it is not dangerous to miss one night.   5. Communicate. Call our skilled team on the number provided on the first day if your visit for problems that make it difficult to wear the device. Over 2 out of 3 patients can learn to wear the device long-term with help from our team. Remember to call our team or your sleep providers if you are unable to wear the device as we may have other solutions for those who cannot adapt to mask CPAP therapy. It is recommended that you sleep your sleep provider within the first 3 months and yearly after that if you are not having problems.   6. Use it for your health. We encourage use of CPAP masks during daytime quiet periods to allow your face and brain to adapt to the sensation of CPAP so that it will be a more natural sensation to awaken to at night or during naps. This can be very useful during the first few weeks or months of adapting to CPAP though it does not help medically to wear CPAP during wakefulness and  should not be used as a strategy just to meet guidelines.  7. Take care of your equipment. Make sure you clean your mask and tubing using directions every day and that your filter and mask are replaced as recommended or if they are not working.     BESIDES CPAP, WHAT OTHER THERAPIES ARE THERE?    Positioning Device  Positioning devices are generally used when sleep apnea is mild and only occurs on your back.This example shows a pillow that straps around the waist. It may be appropriate for those  whose sleep study shows milder sleep apnea that occurs primarily when lying flat on one's back. Preliminary studies have shown benefit but effectiveness at home may need to be verified by a home sleep test. These devices are generally not covered by medical insurance.  Examples of devices that maintain sleeping on the back to prevent snoring and mild sleep apnea.    Belt type body positioner  http://PublicRelay.Loopster/    Electronic reminder  http://nightshifttherapy.com/  http://www.BillShrink.Loopster.au/      Oral Appliance  What is oral appliance therapy?  An oral appliance device fits on your teeth at night like a retainer used after having braces. The device is made by a specialized dentist and requires several visits over 1-2 months before a manufactured device is made to fit your teeth and is adjusted to prevent your sleep apnea. Once an oral device is working properly, snoring should be improved. A home sleep test may be recommended at that time if to determine whether the sleep apnea is adequately treated.       Some things to remember:  -Oral devices are often, but not always, covered by your medical insurance. Be sure to check with your insurance provider.   -If you are referred for oral therapy, you will be given a list of specialized dentists to consider or you may choose to visit the Web site of the American Academy of Dental Sleep Medicine  -Oral devices are less likely to work if you have severe sleep apnea or are extremely overweight.     More detailed information  An oral appliance is a small acrylic device that fits over the upper and lower teeth  (similar to a retainer or a mouth guard). This device slightly moves jaw forward, which moves the base of the tongue forward, opens the airway, improves breathing for effective treat snoring and obstructive sleep apnea in perhaps 7 out of 10 people .  The best working devices are custom-made by a dental device  after a mold is made of the teeth 1, 2,  3.  When is an oral appliance indicated?  Oral appliance therapy is recommended as a first-line treatment for patients with primary snoring, mild sleep apnea, and for patients with moderate sleep apnea who prefer appliance therapy to use of CPAP4, 5. Severity of sleep apnea is determined by sleep testing and is based on the number of respiratory events per hour of sleep.   How successful is oral appliance therapy?  The success rate of oral appliance therapy in patients with mild sleep apnea is 75-80% while in patients with moderate sleep apnea it is 50-70%. The chance of success in patients with severe sleep apnea is 40-50%. The research also shows that oral appliances have a beneficial effect on the cardiovascular health of CASH patients at the same magnitude as CPAP therapy7.  Oral appliances should be a second-line treatment in cases of severe sleep apnea, but if not completely successful then a combination therapy utilizing CPAP plus oral appliance therapy may be effective. Oral appliances tend to be effective in a broad range of patients although studies show that the patients who have the highest success are females, younger patients, those with milder disease, and less severe obesity. 3, 6.   Finding a dentist that practices dental sleep medicine  Specific training is available through the American Academy of Dental Sleep Medicine for dentists interested in working in the field of sleep. To find a dentist who is educated in the field of sleep and the use of oral appliances, near you, visit the Web site of the American Academy of Dental Sleep Medicine.    References  1. Demond et al. Objectively measured vs self-reported compliance during oral appliance therapy for sleep-disordered breathing. Chest 2013; 144(5): 0248-8565.  2. Louis et al. Objective measurement of compliance during oral appliance therapy for sleep-disordered breathing. Thorax 2013; 68(1): 91-96.  3. Inna et al. Mandibular  advancement devices in 620 men and women with CASH and snoring: tolerability and predictors of treatment success. Chest 2004; 125: 0556-8193.  4. Latasha et al. Oral appliances for snoring and CASH: a review. Sleep 2006; 29: 244-262.  5. Adri et al. Oral appliance treatment for CASH: an update. J Clin Sleep Med 2014; 10(2): 215-227.  6. Harry et al. Predictors of OSAH treatment outcome. J Dent Res 2007; 86: 4328-7135.      Weight Loss:    Weight loss is a long-term strategy that may improve sleep apnea in some patients.    Weight management is a personal decision and the decision should be based on your interest and the potential benefits.  If you are interested in exploring weight loss strategies, the following discussion covers the impact on weight loss on sleep apnea and the approaches that may be successful.    Being overweight does not necessarily mean you will have health consequences.  Those who have BMI over 35 or over 27 with existing medical conditions carries greater risk.   Weight loss decreases severity of sleep apnea in most people with obesity. For those with mild obesity who have developed snoring with weight gain, even 15-30 pound weight loss can improve and occasionally eliminate sleep apnea.  Structured and life-long dietary and health habits are necessary to lose weight and keep healthier weight levels.     Though there may be significant health benefits from weight loss, long-term weight loss is very difficult to achieve- studies show success with dietary management in less than 10% of people. In addition, substantial weight loss may require years of dietary control and may be difficult if patients have severe obesity. In these cases, surgical management may be considered.  Finally, older individuals who have tolerated obesity without health complications may be less likely to benefit from weight loss strategies.        Your BMI is Body mass index is 34.7 kg/m .  Weight management is a  personal decision.  If you are interested in exploring weight loss strategies, the following discussion covers the approaches that may be successful. Body mass index (BMI) is one way to tell whether you are at a healthy weight, overweight, or obese. It measures your weight in relation to your height.  A BMI of 18.5 to 24.9 is in the healthy range. A person with a BMI of 25 to 29.9 is considered overweight, and someone with a BMI of 30 or greater is considered obese. More than two-thirds of American adults are considered overweight or obese.  Being overweight or obese increases the risk for further weight gain. Excess weight may lead to heart disease and diabetes.  Creating and following plans for healthy eating and physical activity may help you improve your health.  Weight control is part of healthy lifestyle and includes exercise, emotional health, and healthy eating habits. Careful eating habits lifelong are the mainstay of weight control. Though there are significant health benefits from weight loss, long-term weight loss with diet alone may be very difficult to achieve- studies show long-term success with dietary management in less than 10% of people. Attaining a healthy weight may be especially difficult to achieve in those with severe obesity. In some cases, medications, devices and surgical management might be considered.  What can you do?  If you are overweight or obese and are interested in methods for weight loss, you should discuss this with your provider.     Consider reducing daily calorie intake by 500 calories.     Keep a food journal.     Avoiding skipping meals, consider cutting portions instead.    Diet combined with exercise helps maintain muscle while optimizing fat loss. Strength training is particularly important for building and maintaining muscle mass. Exercise helps reduce stress, increase energy, and improves fitness. Increasing exercise without diet control, however, may not burn enough  calories to loose weight.       Start walking three days a week 10-20 minutes at a time    Work towards walking thirty minutes five days a week     Eventually, increase the speed of your walking for 1-2 minutes at time    In addition, we recommend that you review healthy lifestyles and methods for weight loss available through the National Institutes of Health patient information sites:  http://win.niddk.nih.gov/publications/index.htm    And look into health and wellness programs that may be available through your health insurance provider, employer, local community center, or kiki club.    Weight management plan: Patient was referred to their PCP to discuss a diet and exercise plan.      Surgery:    Surgery for obstructive sleep apnea is considered generally only when other therapies fail to work. Surgery may be discussed with you if you are having a difficult time tolerating CPAP and or when there is an abnormal structure that requires surgical correction.  Nose and throat surgeries often enlarge the airway to prevent collapse.  Most of these surgeries create pain for 1-2 weeks and up to half of the most common surgeries are not effective throughout life.  You should carefully discuss the benefits and drawbacks to surgery with your sleep provider and surgeon to determine if it is the best solution for you.   More information  Surgery for CASH is directed at areas that are responsible for narrowing or complete obstruction of the airway during sleep.  There are a wide range of procedures available to enlarge and/or stabilize the airway to prevent blockage of breathing in the three major areas where it can occur: the palate, tongue, and nasal regions.  Successful surgical treatment depends on the accurate identification of the factors responsible for obstructive sleep apnea in each person.  A personalized approach is required because there is no single treatment that works well for everyone.  Because of anatomic  variation, consultation with an examination by a sleep surgeon is a critical first step in determining what surgical options are best for each patient.  In some cases, examination during sedation may be recommended in order to guide the selection of procedures.  Patients will be counseled about risks and benefits as well as the typical recovery course after surgery. Surgery is typically not a cure for a person s CASH.  However, surgery will often significantly improve one s CASH severity (termed  success rate ).  Even in the absence of a cure, surgery will decrease the cardiovascular risk associated with OSA7; improve overall quality of life8 (sleepiness, functionality, sleep quality, etc).      Palate Procedures:  Patients with CASH often have narrowing of their airway in the region of their tonsils and uvula.  The goals of palate procedures are to widen the airway in this region as well as to help the tissues resist collapse.  Modern palate procedure techniques focus on tissue conservation and soft tissue rearrangement, rather than tissue removal.  Often the uvula is preserved in this procedure. Residual sleep apnea is common in patient after pharyngoplasty with an average reduction in sleep apnea events of 33%2.      Tongue Procedures:  ExamWhile patients are awake, the muscles that surround the throat are active and keep this region open for breathing. These muscles relax during sleep, allowing the tongue and other structures to collapse and block breathing.  There are several different tongue procedures available.  Selection of a tongue base procedure depends on characteristics seen on physical exam.  Generally, procedures are aimed at removing bulky tissues in this area or preventing the back of the tongue from falling back during sleep.  Success rates for tongue surgery range from 50-62%3.    Hypoglossal Nerve Stimulation:  Hypoglossal nerve stimulation has recently received approval from the [place] Food  and Drug Administration for the treatment of obstructive sleep apnea.  This is based on research showing that the system was safe and effective in treating sleep apnea6.  Results showed that the median AHI score decreased 68%, from 29.3 to 9.0. This therapy uses an implant system that senses breathing patterns and delivers mild stimulation to airway muscles, which keeps the airway open during sleep.  The system consists of three fully implanted components: a small generator (similar in size to a pacemaker), a breathing sensor, and a stimulation lead.  Using a small handheld remote, a patient turns the therapy on before bed and off upon awakening.    Candidates for this device must be greater than 22 years of age, have moderate to severe CASH (AHI between 20-65), BMI less than 32, have tried CPAP/oral appliance without success, and have appropriate upper airway anatomy (determined by a sleep endoscopy performed by Dr. Tapia).    Hypoglossal Nerve Stimulation Pathway:    The sleep surgeon s office will work with the patient through the insurance prior-authorization process (including communications and appeals).    Nasal Procedures:  Nasal obstruction can interfere with nasal breathing during the day and night.  Studies have shown that relief of nasal obstruction can improve the ability of some patients to tolerate positive airway pressure therapy for obstructive sleep apnea1.  Treatment options include medications such as nasal saline, topical corticosteroid and antihistamine sprays, and oral medications such as antihistamines or decongestants. Non-surgical treatments can include external nasal dilators for selected patients. If these are not successful by themselves, surgery can improve the nasal airway either alone or in combination with these other options.      Combination Procedures:  Combination of surgical procedures and other treatments may be recommended, particularly if patients have more than one area of  narrowing or persistent positional disease.  The success rate of combination surgery ranges from 66-80%2,3.    References  1. Tasia CORBETT. The Role of the Nose in Snoring and Obstructive Sleep Apnoea: An Update.  Eur Arch Otorhinolaryngol. 2011; 268: 1365-73.  2.  Ene SM; Eduard JA; Miesha JR; Pallanch JF; Carolee MB; Forrest SG; Chayito POLK. Surgical modifications of the upper airway for obstructive sleep apnea in adults: a systematic review and meta-analysis. SLEEP 2010;33(10):2569-1462. Neal BOLTON. Hypopharyngeal surgery in obstructive sleep apnea: an evidence-based medicine review.  Arch Otolaryngol Head Neck Surg. 2006 Feb;132(2):206-13.  3. Danny YH1, Gustavo Y, Miko JAYDE. The efficacy of anatomically based multilevel surgery for obstructive sleep apnea. Otolaryngol Head Neck Surg. 2003 Oct;129(4):327-35.  4. Neal BOLTON, Goldberg A. Hypopharyngeal Surgery in Obstructive Sleep Apnea: An Evidence-Based Medicine Review. Arch Otolaryngol Head Neck Surg. 2006 Feb;132(2):206-13.  5. Dragano PJ et al. Upper-Airway Stimulation for Obstructive Sleep Apnea.  N Engl J Med. 2014 Jan 9;370(2):139-49.  6. Monie Y et al. Increased Incidence of Cardiovascular Disease in Middle-aged Men with Obstructive Sleep Apnea. Am J Respir Crit Care Med; 2002 166: 159-165  7. Angel EM et al. Studying Life Effects and Effectiveness of Palatopharyngoplasty (SLEEP) study: Subjective Outcomes of Isolated Uvulopalatopharyngoplasty. Otolaryngol Head Neck Surg. 2011; 144: 623-631.

## 2021-03-29 NOTE — PROGRESS NOTES
Jose is a 40 year old who is being evaluated via a billable video visit.      How would you like to obtain your AVS? MyChart  If the video visit is dropped, the invitation should be resent by: Text to cell phone: [phone]  Will anyone else be joining your video visit? No      Video Start Time: 12:43PM     [facility]   Outpatient Sleep Medicine Consultation  March 30, 2021      Name: Mario Delgado MRN# [id number]   Age: 40 year old YOB: 1980     Date of Consultation: March 30, 2021  Consultation is requested by: Mira Arboleda PA-C  [address] [place] [address] Mira Arboleda  Primary care provider: Mira Arboleda         Assessment and Plan:   1. Witnessed apneic spells  2. Snoring  3. Excessive daytime sleepiness    Patient is being evaluated for Obstructive Sleep Apnea (CASH).  Patient's risk factors for CASH include: loud snoring, excessive daytime sleepiness (ESS 10), witnessed apneas, suspected enlarged neck girth (reports 18in collar), obesity (BMI 34.7), family history, and male gender.     We discussed pathophysiology of CASH and testing with overnight attended polysomnography versus home sleep apnea testing.  Home sleep testing is appropriate for this patient given lack of comorbidity and is preferred by patient insurance, study has been ordered today.     - HST-Home Sleep Apnea Test; Future    Briefly discussed potential treatment modalities in the event sleep apnea is identified on testing (CPAP and MAD only) and additional information given in patient instructions. Will plan to discuss in more detail at next visit pending study results but patient is open to CPAP if this is recommended.     Patient was counseled on the importance of driving while alert, to pull over if drowsy, or nap before getting into the vehicle if sleepy.    Follow up 1-2 weeks following his study for review of results and to expedite care. Educational materials provided in  "instructions.         Chief Complaint / Reason for Sleep Consult:     Chief Complaint   Patient presents with     Sleep Problem     Snoring            History of Present Illness:     Mario Delgado is a 40 year old male with allergies and asthma who presents to the clinic for evaluation of snoring and suspected sleep apnea.     Patient presents with a \"many\" year history of snoring and daytime tiredness. Snoring has been present since age [age]. Snoring has worsened over the past 2 years.Girlfriend (bed partner) compares snoring to \"like a bear\" and is disruptive to her sleep. Witnessed apneas by girlfriend and parents - \"they start to worry because I stop breathing and then a burst of snoring comes out of me\". Denies obvious gasp/choking arousals. Sleeps in all body positions but favors side sleeping. Denies nocturia.  Denies nocturnal GERD. Denies morning headaches or confusion. Frequent morning dry mouth. Known mouth breather at night.  Denies morning nasal/sinus congestion unless up at cabin. Has used OTC mouthguard snoring devices in the past without success - \"it ends up on the floor like I just spit it out\". Nasal dilator used in the past was more helpful than mouthguard.     On weekdays, goes to bed at 9:00PM and wakes at 3:30AM for work. Falls asleep \"instantly\". On weekends, goes to bed at 9-9:30PM and wakes around 6:30-7:00AM. Denies any nighttime awakenings. Does not feel any better on weekends when sleeps in.     Denies planned napping but does inadvertently doze after work if relaxing, \"I might sit down on the couch and doze off for 10 or 20 minutes before dinner or something after work\". He had a total [place] Sleepiness Scale of 10 (03/30/21 1400), with scores of 10 or higher indicating abnormal levels of sleepiness.    No accidents but does report sleepiness while driving to work in the morning the pat 3-4 months - \"like where my head starts to nodd and I roll down the windows or take a drink of " "water or chew gum and I catch myself right away but it does scare me\". Patient was counseled on the importance of driving while alert, to pull over if drowsy, or nap before getting into the vehicle if sleepy.    Denies typical restless legs syndrome symptoms but does have sore feet at bedtime from being up and active on his feet all day at work. Reports \"it just feels like I want to squeeze them and they swell up every once in a while\". Reports was on gabapentin in the past for this but recently changed to trazodone. Does not like trazodone as causes significant daytime drowsiness next day and gives him \"crazy dreams\". Denies kicking/leg movements.      Patient denies any abnormal movements or behaviors in their sleep. No dream enactment behavior. Rare somniloquy, more common with alcohol use.  No somnambulism.  No sleep related eating. Denies recurring nightmares or night terrors. Denies bruxism.     SCALES       SLEEP APNEA: Stopbang score  4/8       INSOMNIA:  Insomnia severity score: 13/28   absence of insomnia (0-7); sub-threshold insomnia (8-14); moderate insomnia (15-21); and severe insomnia (22-28)       SLEEPINESS: Dexter sleepiness scale: 5/24 [normal < 11]          Medications:     Current Outpatient Medications   Medication Sig     albuterol (VENTOLIN HFA) 108 (90 Base) MCG/ACT inhaler INHALE 3 PUFFS INTO THE LUNGS EVERY 4 HOURS AS NEEDED FOR SHORTNESS OF BREATH OR DIFFICULT BREATHING OR WHEEZING     fluticasone-salmeterol (ADVAIR DISKUS) 500-50 MCG/DOSE inhaler Inhale 1 puff into the lungs 2 times daily     levalbuterol (XOPENEX HFA) 45 MCG/ACT inhaler Inhale 2 puffs into the lungs every 4 hours as needed for shortness of breath / dyspnea or wheezing     montelukast (SINGULAIR) 10 MG tablet Take 1 tablet (10 mg) by mouth At Bedtime     nabumetone (RELAFEN) 750 MG tablet Take 1 tablet (750 mg) by mouth 2 times daily     traZODone (DESYREL) 50 MG tablet Take 1-2 tablets ( mg) by mouth At Bedtime " "    No current facility-administered medications for this visit.            Allergies:     Allergies   Allergen Reactions     No Known Drug Allergies      Pollen Extract           Past Medical History:     Past Medical History:   Diagnosis Date     Mild intermittent asthma            Past Surgical History:      Past Surgical History:   Procedure Laterality Date     ENT SURGERY      Nasal Fracture     NO HISTORY OF SURGERY            Social History:     Social History     Tobacco Use     Smoking status: Never Smoker     Smokeless tobacco: Former User     Types: Chew   Substance Use Topics     Alcohol use: Yes     Comment: 3-4 drinks 1-2x/month     Chemical History:  Alcohol use: 4-5 drinks per week, not used as sleep aid    Tobacco use: Previous chew, not in past 5 years    Illicit substances: Denies   Caffeine intake: Drinks 1 can of pop every 2-3 days          Family History:     Family History   Problem Relation Age of Onset     Sleep Apnea Father      Asthma Mother      Family History Negative No family hx of       Sleep Family Hx: Father with CASH and used CPAP. Patient denies any known family history of restless legs syndrome, narcolepsy or other sleep disorders.          Review of Systems:   A complete 10 point review of systems was negative other than HPI or as commented below:   Hearing loss, tinnitus, nasal congestion, sinus pain, voice hoarseness, sputum or phlegm, difficulty breathing or shortness of breath, snoring, wheezing, difficulty breathing on exertion, difficulty breathing when lying flat, muscle aches, neck pain, joint pain, muscle cramps, muscle weakness, joint stiffness         Physical Examination:   Ht 1.753 m (5' 9\")   Wt 106.6 kg (235 lb)   BMI 34.70 kg/m    General appearance: Awake, alert, cooperative. Well groomed. Sitting comfortably in chair. In no apparent distress.  HEENT: Head: Normocephalic, atraumatic. Eyes:Conjunctiva clear. Sclera normal. Nose: External appearance without " deformity.   Neck: No visible thyroid enlargement.   Cardiovascular: No JVD  Pulmonary:  Able to speak easily in full sentences. No cough or wheeze.   Skin:  No rashes or significant lesions on visible skin.   Neurologic: Alert, oriented x3.   Psychiatric: Mood euthymic. Affect congruent with full range and intensity.         Data: All pertinent previous laboratory data reviewed     No results found for: PH, PHARTERIAL, PO2, [id number], SAT, PCO2, HCO3, BASEEXCESS, RITESH, BEB  No results found for: TSH  Lab Results   Component Value Date     (A) 08/31/2017     Lab Results   Component Value Date    HGB 15.8 10/18/2020    HGB 16.4 09/04/2004     Lab Results   Component Value Date    CR 1.1 08/31/2017     No results found for: AST, ALT, GGT, ALKPHOS, BILITOTAL, BILICONJ, BILIDIRECT, CATRACHITA  No results found for: UAMP, UBARB, BENZODIAZEUR, UCANN, UCOC, OPIT, UPCP    Chest x-ray:  CHEST TWO VIEWS  10/18/2020 1:11 PM      HISTORY:  Chest pain.     COMPARISON: None.                                                             IMPRESSION: Mild elevation of the left hemidiaphragm. Mild scarring or  linear atelectasis in the left lower lung. The lungs are otherwise  clear. No pleural effusions. Heart size and pulmonary vascularity are  within normal limits..    Copy to: Mira Arboleda PA-C  Mar 30, 2021     [facility]  [address] [place] [id number]     [facility]  [address] [place] [id number]    Chart documentation was completed, in part, with [employer] voice-recognition software. Even though reviewed, some grammatical, spelling, and word errors may remain.    Video-Visit Details    Type of service:  Video Visit    Video End Time:3:26PM    Originating Location (pt. Location): Home    Distant Location (provider location):  [facility]     Platform used for Video Visit: Tesora  64  minutes  spent on day of encounter doing chart review, history and exam, documentation, and further activities as noted above

## 2021-03-30 ENCOUNTER — VIRTUAL VISIT (OUTPATIENT)
Dept: SLEEP MEDICINE | Facility: CLINIC | Age: 41
End: 2021-03-30
Payer: COMMERCIAL

## 2021-03-30 DIAGNOSIS — G47.19 EXCESSIVE DAYTIME SLEEPINESS: ICD-10-CM

## 2021-03-30 DIAGNOSIS — R06.83 SNORING: ICD-10-CM

## 2021-03-30 DIAGNOSIS — R06.81 WITNESSED APNEIC SPELLS: Primary | ICD-10-CM

## 2021-03-30 PROCEDURE — 99205 OFFICE O/P NEW HI 60 MIN: CPT | Mod: 95 | Performed by: PHYSICIAN ASSISTANT

## 2021-04-30 ENCOUNTER — TELEPHONE (OUTPATIENT)
Dept: SLEEP MEDICINE | Facility: CLINIC | Age: 41
End: 2021-04-30

## 2021-06-29 NOTE — PROGRESS NOTES
"  SUBJECTIVE:   Mario Delgado is a 38 year old male who presents to clinic today for the following health issues:      Asthma Follow-Up    Was ACT completed today?    Yes    ACT Total Scores 10/18/2012   ACT TOTAL SCORE 18   ASTHMA ER VISITS 0 = None   ASTHMA HOSPITALIZATIONS 0 = None       Recent asthma triggers that patient is dealing with: smoke, upper respiratory infections, dust mites and pollens        Amount of exercise or physical activity: 1-2 days/week for an average of 45-60 minutes    Problems taking medications regularly: No    Medication side effects: none    Diet: regular (no restrictions)            Problem list and histories reviewed & adjusted, as indicated.  Additional history: as documented    Patient Active Problem List   Diagnosis     CARDIOVASCULAR SCREENING; LDL GOAL LESS THAN 160     Seasonal allergic rhinitis     Moderate persistent asthma     Past Surgical History:   Procedure Laterality Date     ENT SURGERY      Nasal Fracture     NO HISTORY OF SURGERY         Social History     Tobacco Use     Smoking status: Never Smoker     Smokeless tobacco: Current User     Types: Chew   Substance Use Topics     Alcohol use: Yes     Comment: 3-4 drinks 1-2x/month     Family History   Problem Relation Age of Onset     Asthma Mother      Family History Negative No family hx of            Reviewed and updated as needed this visit by clinical staff  Tobacco  Allergies  Meds  Med Hx  Surg Hx  Fam Hx  Soc Hx      Reviewed and updated as needed this visit by Provider         ROS:  Constitutional, HEENT, cardiovascular, pulmonary, gi and gu systems are negative, except as otherwise noted.    OBJECTIVE:     /88 (BP Location: Right arm, Patient Position: Chair, Cuff Size: Adult Large)   Pulse 78   Temp 98.2  F (36.8  C) (Oral)   Resp 16   Ht 1.753 m (5' 9\")   Wt 113.9 kg (251 lb)   SpO2 96%   BMI 37.07 kg/m    Body mass index is 37.07 kg/m .  GENERAL APPEARANCE: healthy, alert and no " distress  RESP: lungs clear to auscultation - no rales, rhonchi or wheezes  CV: regular rates and rhythm, normal S1 S2, no S3 or S4 and no murmur, click or rub        ASSESSMENT/PLAN:             1. Moderate persistent asthma without complication  Recheck 6-12 months  Call on maintenance inhalers to see what has better coverage.   - fluticasone-salmeterol (ADVAIR DISKUS) 500-50 MCG/DOSE inhaler; Inhale 1 puff into the lungs 2 times daily  Dispense: 1 Inhaler; Refill: 3  - albuterol (PROAIR HFA/PROVENTIL HFA/VENTOLIN HFA) 108 (90 Base) MCG/ACT inhaler; Inhale 1-2 puffs into the lungs every 4 hours as needed for shortness of breath / dyspnea or wheezing  Dispense: 1 Inhaler; Refill: 3        Mira Arboleda PA-C  [facility]     [Follow-Up] : a follow-up visit [Shortness of Breath] : shortness of breath

## 2021-07-27 DIAGNOSIS — J45.40 MODERATE PERSISTENT ASTHMA WITHOUT COMPLICATION: ICD-10-CM

## 2021-07-29 RX ORDER — ALBUTEROL SULFATE 90 UG/1
AEROSOL, METERED RESPIRATORY (INHALATION)
Qty: 36 G | Refills: 0 | OUTPATIENT
Start: 2021-07-29

## 2021-09-01 DIAGNOSIS — J45.40 MODERATE PERSISTENT ASTHMA WITHOUT COMPLICATION: ICD-10-CM

## 2021-09-01 RX ORDER — ALBUTEROL SULFATE 90 UG/1
AEROSOL, METERED RESPIRATORY (INHALATION)
Qty: 17 G | Refills: 1 | Status: SHIPPED | OUTPATIENT
Start: 2021-09-01 | End: 2021-11-02

## 2021-10-24 ENCOUNTER — HEALTH MAINTENANCE LETTER (OUTPATIENT)
Age: 41
End: 2021-10-24

## 2021-10-31 DIAGNOSIS — J45.40 MODERATE PERSISTENT ASTHMA WITHOUT COMPLICATION: ICD-10-CM

## 2021-11-02 RX ORDER — ALBUTEROL SULFATE 90 UG/1
AEROSOL, METERED RESPIRATORY (INHALATION)
Qty: 17 G | Refills: 1 | Status: SHIPPED | OUTPATIENT
Start: 2021-11-02 | End: 2022-01-04

## 2022-01-02 DIAGNOSIS — J45.40 MODERATE PERSISTENT ASTHMA WITHOUT COMPLICATION: ICD-10-CM

## 2022-01-04 RX ORDER — ALBUTEROL SULFATE 90 UG/1
AEROSOL, METERED RESPIRATORY (INHALATION)
Qty: 17 G | Refills: 1 | Status: SHIPPED | OUTPATIENT
Start: 2022-01-04 | End: 2022-03-31

## 2022-01-04 NOTE — TELEPHONE ENCOUNTER
Routing refill request to provider for review/approval because:  Labs out of range:  ACT  Patient needs to be seen because:  due for follow up    Marcelo ROMERO RN

## 2022-01-09 ENCOUNTER — MYC MEDICAL ADVICE (OUTPATIENT)
Dept: FAMILY MEDICINE | Facility: CLINIC | Age: 42
End: 2022-01-09
Payer: COMMERCIAL

## 2022-02-13 ENCOUNTER — HEALTH MAINTENANCE LETTER (OUTPATIENT)
Age: 42
End: 2022-02-13

## 2022-02-25 ENCOUNTER — TELEPHONE (OUTPATIENT)
Dept: FAMILY MEDICINE | Facility: CLINIC | Age: 42
End: 2022-02-25
Payer: COMMERCIAL

## 2022-02-25 NOTE — TELEPHONE ENCOUNTER
Patient Quality Outreach    Patient is due for the following:   Asthma  -  ACT needed, Asthma follow-up visit and AAP  Physical  - Due now  Immunizations  -  Covid and Influenza    NEXT STEPS:   Schedule a yearly physical    Type of outreach:    Sent [employer]CI[employer] message.      Questions for provider review:    None     Bryan Tay      Patient Quality Outreach    Patient is due for the following:   Asthma  -  ACT needed, Asthma follow-up visit and AAP  Physical  - due now  Immunizations  -  Covid and Influenza    NEXT STEPS:   Schedule a yearly physical    Type of outreach:    Sent letter. and Copy of ACT mailed to patient.    Next Steps:  Reach out within 90 days via [employer].    Max number of attempts reached: Yes. Will try again in 90 days if patient still on fail list.    Questions for provider review:    None     Bryan Tay  Chart routed to Care Team.

## 2022-02-25 NOTE — LETTER
[facility]  [id number] [place] [id number]  [phone]  April 5, 2022    Mario Delgado  [address] [place], [place] [address]KENDRA CUELLO  [facility] [address]    Dear Mario,    I care about your health and have reviewed your health plan. I have reviewed your medical conditions, medication list, and lab results and am making recommendations based on this review, to better manage your health.    You are in particular need of attention regarding:  -Asthma  -Wellness (Physical) Visit   -Immunizations    I am recommending that you:  {recommendations:-schedule a WELLNESS (Physical) APPOINTMENT with me.   I will check fasting labs the same day - nothing to eat except water and meds for 8-10 hours prior.  -Please fill out ACT and return in enclosed envelope    Here is a list of Health Maintenance topics that are due now or due soon:  Health Maintenance Due   Topic Date Due     PREVENTIVE CARE VISIT  Never done     ADVANCE CARE PLANNING  Never done     COVID-19 Vaccine (1) Never done     Pneumococcal Vaccine: Pediatrics (0 to 5 Years) and At-Risk Patients (6 to 64 Years) (1 of 2 - PPSV23) Never done     HIV SCREENING  Never done     HEPATITIS C SCREENING  Never done     LIPID  Never done     ASTHMA ACTION PLAN  01/15/2020     ASTHMA CONTROL TEST  08/23/2021     INFLUENZA VACCINE (1) Never done     PHQ-2 (once per calendar year)  01/01/2022     ANNUAL REVIEW OF HM ORDERS  02/23/2022       Please call us at [phone] (or use Spontaneously) to address the above recommendations.     Thank you for trusting [facility] and we appreciate the opportunity to serve you.  We look forward to supporting your healthcare needs in the future.    Healthy Regards,    Mira Arboleda PA-C

## 2022-03-30 DIAGNOSIS — J45.40 MODERATE PERSISTENT ASTHMA WITHOUT COMPLICATION: ICD-10-CM

## 2022-03-30 NOTE — LETTER
April 1, 2022      Mario Delgado  [address] [facility] [address]      Dear Mario,    We recently received a call from your pharmacy requesting a refill of your medication.    A review of your chart indicates that an appointment is required with your provider.  Please call the clinic to schedule your appointment.    We have authorized one refill of your medication to allow time for you to schedule.   If you have a history of diabetes or high cholesterol, please come in fasting for the appointment. Fasting entails nothing to eat or drink 8 hours prior to your appointment; with the exception on water. You may take your medication the day of the appointment.    Thank you,      Mira Arboleda PA-C

## 2022-03-31 RX ORDER — ALBUTEROL SULFATE 90 UG/1
AEROSOL, METERED RESPIRATORY (INHALATION)
Qty: 17 G | Refills: 0 | Status: SHIPPED | OUTPATIENT
Start: 2022-03-31 | End: 2022-04-19

## 2022-03-31 NOTE — TELEPHONE ENCOUNTER
Vnaessa cid given  Needs updated ACT and appt  Routing to MA/MARTINA Rojas RN on 3/31/2022 at 11:18 AM

## 2022-04-19 ENCOUNTER — OFFICE VISIT (OUTPATIENT)
Dept: FAMILY MEDICINE | Facility: CLINIC | Age: 42
End: 2022-04-19
Payer: COMMERCIAL

## 2022-04-19 VITALS
HEART RATE: 93 BPM | WEIGHT: 291.3 LBS | SYSTOLIC BLOOD PRESSURE: 136 MMHG | RESPIRATION RATE: 20 BRPM | OXYGEN SATURATION: 93 % | DIASTOLIC BLOOD PRESSURE: 88 MMHG | BODY MASS INDEX: 43.14 KG/M2 | HEIGHT: 69 IN

## 2022-04-19 DIAGNOSIS — J45.40 MODERATE PERSISTENT ASTHMA WITHOUT COMPLICATION: Primary | ICD-10-CM

## 2022-04-19 DIAGNOSIS — G47.09 OTHER INSOMNIA: ICD-10-CM

## 2022-04-19 DIAGNOSIS — E66.01 MORBID OBESITY (H): ICD-10-CM

## 2022-04-19 PROCEDURE — 99214 OFFICE O/P EST MOD 30 MIN: CPT | Performed by: PHYSICIAN ASSISTANT

## 2022-04-19 RX ORDER — ALBUTEROL SULFATE 90 UG/1
AEROSOL, METERED RESPIRATORY (INHALATION)
Qty: 38 G | Refills: 3 | Status: SHIPPED | OUTPATIENT
Start: 2022-04-19 | End: 2022-10-05

## 2022-04-19 RX ORDER — MONTELUKAST SODIUM 10 MG/1
10 TABLET ORAL AT BEDTIME
Qty: 90 TABLET | Refills: 3 | Status: SHIPPED | OUTPATIENT
Start: 2022-04-19 | End: 2023-02-22

## 2022-04-19 RX ORDER — TRAZODONE HYDROCHLORIDE 50 MG/1
50-100 TABLET, FILM COATED ORAL AT BEDTIME
Qty: 60 TABLET | Refills: 3 | Status: SHIPPED | OUTPATIENT
Start: 2022-04-19 | End: 2024-09-03

## 2022-04-19 ASSESSMENT — ASTHMA QUESTIONNAIRES
QUESTION_2 LAST FOUR WEEKS HOW OFTEN HAVE YOU HAD SHORTNESS OF BREATH: THREE TO SIX TIMES A WEEK
ACT_TOTALSCORE: 13
QUESTION_3 LAST FOUR WEEKS HOW OFTEN DID YOUR ASTHMA SYMPTOMS (WHEEZING, COUGHING, SHORTNESS OF BREATH, CHEST TIGHTNESS OR PAIN) WAKE YOU UP AT NIGHT OR EARLIER THAN USUAL IN THE MORNING: ONCE OR TWICE
QUESTION_5 LAST FOUR WEEKS HOW WOULD YOU RATE YOUR ASTHMA CONTROL: POORLY CONTROLLED
QUESTION_4 LAST FOUR WEEKS HOW OFTEN HAVE YOU USED YOUR RESCUE INHALER OR NEBULIZER MEDICATION (SUCH AS ALBUTEROL): THREE OR MORE TIMES PER DAY
ACT_TOTALSCORE: 13
QUESTION_1 LAST FOUR WEEKS HOW MUCH OF THE TIME DID YOUR ASTHMA KEEP YOU FROM GETTING AS MUCH DONE AT WORK, SCHOOL OR AT HOME: SOME OF THE TIME

## 2022-04-19 NOTE — PROGRESS NOTES
"  Assessment & Plan     Moderate persistent asthma without complication  Uncontrolled but stable. Will change advair to trelegy. Follow up ACt in 1 month. Recheck with pcp during wellness in 3 months.   - montelukast (SINGULAIR) 10 MG tablet; Take 1 tablet (10 mg) by mouth At Bedtime  - albuterol (PROAIR HFA/PROVENTIL HFA/VENTOLIN HFA) 108 (90 Base) MCG/ACT inhaler; INHALE 3 PUFFS INTO THE LUNGS EVERY 4 HOURS AS NEEDED FOR SHORTNESS OF BREATH OR WHEEZING  - Fluticasone-Umeclidin-Vilanterol (TRELEGY ELLIPTA) 200-62.5-25 MCG/INH oral inhaler; Inhale 1 puff into the lungs daily  -Medication use and side effects discussed with the patient. Patient is in complete understanding and agreement with plan.       Morbid obesity (H)  ~50 lbs gained. Discussed weight loss through diet and exercise. Return to previous weight is goal.     Other insomnia  Stable. Rare prn use.   - traZODone (DESYREL) 50 MG tablet; Take 1-2 tablets ( mg) by mouth At Bedtime       BMI:   Estimated body mass index is 43.02 kg/m  as calculated from the following:    Height as of this encounter: 1.753 m (5' 9\").    Weight as of this encounter: 132.1 kg (291 lb 4.8 oz).       Return in about 3 months (around 7/19/2022) for Physical Exam, Lab Work.    Dom Graves PA-C  [facility] DAVID Garcia is a 41 year old who presents for the following health issues     History of Present Illness     Asthma:  He presents for follow up of asthma.  He has no cough, no wheezing, and some shortness of breath. He is using a relief medication every 4 hours. He typically misses taking his controller medication 1 time(s) per week.Patient is aware of the following triggers: none. The patient has not had a visit to the Emergency Room, Urgent Care or Hospital due to asthma since the last clinic visit. He has been to the Emergency Room or Urgent Care 0 times.He has had a Hospitalization 0 times.    He eats 0-1 servings of fruits " "and vegetables daily.He consumes 3 sweetened beverage(s) daily.He exercises with enough effort to increase his heart rate 9 or less minutes per day.  He exercises with enough effort to increase his heart rate 3 or less days per week.   He is taking medications regularly.         Review of Systems   Constitutional, HEENT, cardiovascular, pulmonary, GI, , musculoskeletal, neuro, skin, endocrine and psych systems are negative, except as otherwise noted.      Objective    /88   Pulse 93   Resp 20   Ht 1.753 m (5' 9\")   Wt 132.1 kg (291 lb 4.8 oz)   SpO2 93%   BMI 43.02 kg/m    Body mass index is 43.02 kg/m .  Physical Exam   GENERAL: alert, no distress and obese  RESP: lungs clear to auscultation - no rales, rhonchi or wheezes  CV: regular rates and rhythm, normal S1 S2, no S3 or S4 and no murmur, click or rub  PSYCH: mentation appears normal, affect normal/bright          "

## 2022-06-02 ENCOUNTER — TELEPHONE (OUTPATIENT)
Dept: FAMILY MEDICINE | Facility: CLINIC | Age: 42
End: 2022-06-02
Payer: COMMERCIAL

## 2022-06-02 NOTE — TELEPHONE ENCOUNTER
Patient Quality Outreach    Patient is due for the following:   Asthma  -  ACT needed    NEXT STEPS:   complete ACT    Type of outreach:    Sent 8020select message.      Questions for provider review:    None     Dary Mccollum, CMA

## 2022-06-08 NOTE — TELEPHONE ENCOUNTER
Routing refill request to provider for review/approval because:  Labs out of range:  ACT  Patient needs to be seen because:  due for 6 month follow up w/ PCP    Marcelo ROMERO RN         [General Appearance - Alert] : alert [General Appearance - In No Acute Distress] : in no acute distress [Sclera] : the sclera and conjunctiva were normal [Outer Ear] : the ears and nose were normal in appearance [Hearing Threshold Finger Rub Not Teller] : hearing was normal [Neck Appearance] : the appearance of the neck was normal [] : no respiratory distress [Apical Impulse] : the apical impulse was normal [Abdomen Soft] : soft [Abdomen Tenderness] : non-tender [Abnormal Walk] : normal gait [Skin Color & Pigmentation] : normal skin color and pigmentation [Oriented To Time, Place, And Person] : oriented to person, place, and time [FreeTextEntry1] : deferred to colonoscopy

## 2022-10-05 DIAGNOSIS — J45.40 MODERATE PERSISTENT ASTHMA WITHOUT COMPLICATION: ICD-10-CM

## 2022-10-05 RX ORDER — ALBUTEROL SULFATE 90 UG/1
AEROSOL, METERED RESPIRATORY (INHALATION)
Qty: 8.5 G | Refills: 0 | Status: SHIPPED | OUTPATIENT
Start: 2022-10-05 | End: 2022-11-14

## 2022-10-05 NOTE — TELEPHONE ENCOUNTER
Patient calling and out of inhaler and leaving town today.     Routing refill request to provider for review/approval because:  Patient needs to be seen because:  Failing visit  Failing ACT    Thalia Ivory RN    4/19/2022  [facility]    Moderate persistent asthma without complication  Uncontrolled but stable. Will change advair to trelegy. Follow up ACt in 1 month. Recheck with pcp during wellness in 3 months.   - montelukast (SINGULAIR) 10 MG tablet; Take 1 tablet (10 mg) by mouth At Bedtime  - albuterol (PROAIR HFA/PROVENTIL HFA/VENTOLIN HFA) 108 (90 Base) MCG/ACT inhaler; INHALE 3 PUFFS INTO THE LUNGS EVERY 4 HOURS AS NEEDED FOR SHORTNESS OF BREATH OR WHEEZING  - Fluticasone-Umeclidin-Vilanterol (TRELEGY ELLIPTA) 200-62.5-25 MCG/INH oral inhaler; Inhale 1 puff into the lungs daily  -Medication use and side effects discussed with the patient. Patient is in complete understanding and agreement with plan.     Return in about 3 months (around 7/19/2022) for Physical Exam, Lab Work.     Dom Graves PA-C  [facility]

## 2022-10-06 ENCOUNTER — MYC MEDICAL ADVICE (OUTPATIENT)
Dept: FAMILY MEDICINE | Facility: CLINIC | Age: 42
End: 2022-10-06

## 2022-10-06 NOTE — TELEPHONE ENCOUNTER
Left message to call back to schedule appointment. One refill sent to pharmacy yesterday to allow time to schedule.  Detailed message sent in [facility].   Carlos A Martin RN

## 2022-10-15 ENCOUNTER — HEALTH MAINTENANCE LETTER (OUTPATIENT)
Age: 42
End: 2022-10-15

## 2022-11-13 ASSESSMENT — ASTHMA QUESTIONNAIRES
QUESTION_3 LAST FOUR WEEKS HOW OFTEN DID YOUR ASTHMA SYMPTOMS (WHEEZING, COUGHING, SHORTNESS OF BREATH, CHEST TIGHTNESS OR PAIN) WAKE YOU UP AT NIGHT OR EARLIER THAN USUAL IN THE MORNING: ONCE OR TWICE
QUESTION_5 LAST FOUR WEEKS HOW WOULD YOU RATE YOUR ASTHMA CONTROL: SOMEWHAT CONTROLLED
ACT_TOTALSCORE: 14
QUESTION_4 LAST FOUR WEEKS HOW OFTEN HAVE YOU USED YOUR RESCUE INHALER OR NEBULIZER MEDICATION (SUCH AS ALBUTEROL): THREE OR MORE TIMES PER DAY
ACT_TOTALSCORE: 14
QUESTION_1 LAST FOUR WEEKS HOW MUCH OF THE TIME DID YOUR ASTHMA KEEP YOU FROM GETTING AS MUCH DONE AT WORK, SCHOOL OR AT HOME: A LITTLE OF THE TIME
QUESTION_2 LAST FOUR WEEKS HOW OFTEN HAVE YOU HAD SHORTNESS OF BREATH: ONCE A DAY

## 2022-11-14 ENCOUNTER — OFFICE VISIT (OUTPATIENT)
Dept: FAMILY MEDICINE | Facility: CLINIC | Age: 42
End: 2022-11-14
Payer: COMMERCIAL

## 2022-11-14 VITALS
HEART RATE: 112 BPM | RESPIRATION RATE: 14 BRPM | DIASTOLIC BLOOD PRESSURE: 82 MMHG | HEIGHT: 69 IN | WEIGHT: 293.6 LBS | BODY MASS INDEX: 43.48 KG/M2 | TEMPERATURE: 98.3 F | SYSTOLIC BLOOD PRESSURE: 136 MMHG | OXYGEN SATURATION: 95 %

## 2022-11-14 DIAGNOSIS — J45.40 MODERATE PERSISTENT ASTHMA WITHOUT COMPLICATION: ICD-10-CM

## 2022-11-14 PROCEDURE — 90715 TDAP VACCINE 7 YRS/> IM: CPT | Performed by: PHYSICIAN ASSISTANT

## 2022-11-14 PROCEDURE — 99213 OFFICE O/P EST LOW 20 MIN: CPT | Mod: 25 | Performed by: PHYSICIAN ASSISTANT

## 2022-11-14 PROCEDURE — 90471 IMMUNIZATION ADMIN: CPT | Performed by: PHYSICIAN ASSISTANT

## 2022-11-14 RX ORDER — ALBUTEROL SULFATE 90 UG/1
2 AEROSOL, METERED RESPIRATORY (INHALATION) EVERY 4 HOURS PRN
Qty: 36 G | Refills: 4 | Status: SHIPPED | OUTPATIENT
Start: 2022-11-14 | End: 2023-02-22

## 2022-11-14 RX ORDER — FLUTICASONE PROPIONATE AND SALMETEROL 250; 50 UG/1; UG/1
1 POWDER RESPIRATORY (INHALATION) 2 TIMES DAILY
Qty: 60 EACH | Refills: 11 | Status: SHIPPED | OUTPATIENT
Start: 2022-11-14 | End: 2024-09-03

## 2022-11-14 NOTE — PROGRESS NOTES
"  Assessment & Plan     Moderate persistent asthma without complication    Patient has been using Advair from a previous prescription. Trellegy was too expensive and he feels that Advair works well. Sent refills of Advair and albuterol.    - fluticasone-salmeterol (ADVAIR) 250-50 MCG/ACT inhaler; Inhale 1 puff into the lungs 2 times daily  - albuterol (PROAIR HFA/PROVENTIL HFA/VENTOLIN HFA) 108 (90 Base) MCG/ACT inhaler; Inhale 2 puffs into the lungs every 4 hours as needed for shortness of breath / dyspnea or wheezing            No follow-ups on file.    Armando Sousa PA-C  [facility] DAVID Garcia is a 42 year old, presenting for the following health issues:  Asthma      History of Present Illness     Asthma:  He presents for follow up of asthma.  He has no cough, some wheezing, and some shortness of breath. He is using a relief medication 2-3 times per day. He does not miss any doses of his controller medication throughout the week.Patient is aware of the following triggers: cold air. The patient has not had a visit to the Emergency Room, Urgent Care or Hospital due to asthma since the last clinic visit.     He eats 0-1 servings of fruits and vegetables daily.He consumes 3 sweetened beverage(s) daily.He exercises with enough effort to increase his heart rate 10 to 19 minutes per day.  He exercises with enough effort to increase his heart rate 3 or less days per week. He is missing 1 dose(s) of medications per week.  He is not taking prescribed medications regularly due to remembering to take.         Review of Systems   Constitutional, HEENT, cardiovascular, pulmonary, gi and gu systems are negative, except as otherwise noted.        Objective    BP (!) 142/82 (BP Location: Right arm, Patient Position: Sitting, Cuff Size: Adult Large)   Pulse 112   Temp 98.3  F (36.8  C) (Oral)   Resp 14   Ht 1.753 m (5' 9\")   Wt 133.2 kg (293 lb 9.6 oz)   SpO2 95%   BMI 43.36 kg/m  "   Body mass index is 43.36 kg/m .       Physical Exam   GENERAL: healthy, alert and no distress  EYES: Eyes grossly normal to inspection, PERRL and conjunctivae and sclerae normal  RESP: lungs clear to auscultation - no rales, rhonchi or wheezes  CV: regular rate and rhythm, normal S1 S2, no S3 or S4, no murmur, click or rub, no peripheral edema and peripheral pulses strong  MS: no gross musculoskeletal defects noted, no edema  SKIN: no suspicious lesions or rashes  NEURO: Normal strength and tone, mentation intact and speech normal  PSYCH: mentation appears normal, affect normal/bright

## 2023-01-05 ENCOUNTER — TRANSFERRED RECORDS (OUTPATIENT)
Dept: HEALTH INFORMATION MANAGEMENT | Facility: CLINIC | Age: 43
End: 2023-01-05
Payer: COMMERCIAL

## 2023-02-22 ENCOUNTER — VIRTUAL VISIT (OUTPATIENT)
Dept: FAMILY MEDICINE | Facility: CLINIC | Age: 43
End: 2023-02-22
Payer: COMMERCIAL

## 2023-02-22 DIAGNOSIS — R09.81 NASAL CONGESTION: ICD-10-CM

## 2023-02-22 DIAGNOSIS — J45.40 MODERATE PERSISTENT ASTHMA WITHOUT COMPLICATION: Primary | ICD-10-CM

## 2023-02-22 PROBLEM — E66.01 MORBID OBESITY (H): Status: RESOLVED | Noted: 2022-04-19 | Resolved: 2023-02-22

## 2023-02-22 PROCEDURE — 99213 OFFICE O/P EST LOW 20 MIN: CPT | Mod: VID | Performed by: PHYSICIAN ASSISTANT

## 2023-02-22 RX ORDER — MONTELUKAST SODIUM 10 MG/1
10 TABLET ORAL AT BEDTIME
Qty: 90 TABLET | Refills: 3 | Status: SHIPPED | OUTPATIENT
Start: 2023-02-22

## 2023-02-22 RX ORDER — ALBUTEROL SULFATE 90 UG/1
2 AEROSOL, METERED RESPIRATORY (INHALATION) EVERY 4 HOURS PRN
Qty: 36 G | Refills: 4 | Status: SHIPPED | OUTPATIENT
Start: 2023-02-22 | End: 2024-03-07

## 2023-02-22 SDOH — HEALTH STABILITY: PHYSICAL HEALTH: ON AVERAGE, HOW MANY DAYS PER WEEK DO YOU ENGAGE IN MODERATE TO STRENUOUS EXERCISE (LIKE A BRISK WALK)?: 3 DAYS

## 2023-02-22 SDOH — ECONOMIC STABILITY: FOOD INSECURITY: WITHIN THE PAST 12 MONTHS, THE FOOD YOU BOUGHT JUST DIDN'T LAST AND YOU DIDN'T HAVE MONEY TO GET MORE.: NEVER TRUE

## 2023-02-22 SDOH — ECONOMIC STABILITY: INCOME INSECURITY: IN THE LAST 12 MONTHS, WAS THERE A TIME WHEN YOU WERE NOT ABLE TO PAY THE MORTGAGE OR RENT ON TIME?: NO

## 2023-02-22 SDOH — HEALTH STABILITY: PHYSICAL HEALTH: ON AVERAGE, HOW MANY MINUTES DO YOU ENGAGE IN EXERCISE AT THIS LEVEL?: PATIENT DECLINED

## 2023-02-22 SDOH — ECONOMIC STABILITY: TRANSPORTATION INSECURITY
IN THE PAST 12 MONTHS, HAS THE LACK OF TRANSPORTATION KEPT YOU FROM MEDICAL APPOINTMENTS OR FROM GETTING MEDICATIONS?: NO

## 2023-02-22 SDOH — ECONOMIC STABILITY: TRANSPORTATION INSECURITY
IN THE PAST 12 MONTHS, HAS LACK OF TRANSPORTATION KEPT YOU FROM MEETINGS, WORK, OR FROM GETTING THINGS NEEDED FOR DAILY LIVING?: NO

## 2023-02-22 SDOH — ECONOMIC STABILITY: FOOD INSECURITY: WITHIN THE PAST 12 MONTHS, YOU WORRIED THAT YOUR FOOD WOULD RUN OUT BEFORE YOU GOT MONEY TO BUY MORE.: NEVER TRUE

## 2023-02-22 SDOH — ECONOMIC STABILITY: INCOME INSECURITY: HOW HARD IS IT FOR YOU TO PAY FOR THE VERY BASICS LIKE FOOD, HOUSING, MEDICAL CARE, AND HEATING?: NOT HARD AT ALL

## 2023-02-22 ASSESSMENT — LIFESTYLE VARIABLES
HOW MANY STANDARD DRINKS CONTAINING ALCOHOL DO YOU HAVE ON A TYPICAL DAY: 1 OR 2
HOW OFTEN DO YOU HAVE SIX OR MORE DRINKS ON ONE OCCASION: LESS THAN MONTHLY
AUDIT-C TOTAL SCORE: 4
SKIP TO QUESTIONS 9-10: 0
HOW OFTEN DO YOU HAVE A DRINK CONTAINING ALCOHOL: 2-3 TIMES A WEEK
AUDIT-C TOTAL SCORE: 4
HOW OFTEN DO YOU HAVE A DRINK CONTAINING ALCOHOL: 2-3 TIMES A WEEK
HOW OFTEN DO YOU HAVE SIX OR MORE DRINKS ON ONE OCCASION: LESS THAN MONTHLY
HOW MANY STANDARD DRINKS CONTAINING ALCOHOL DO YOU HAVE ON A TYPICAL DAY: 1 OR 2
SKIP TO QUESTIONS 9-10: 0

## 2023-02-22 ASSESSMENT — ENCOUNTER SYMPTOMS
COUGH: 0
EYE PAIN: 0
SHORTNESS OF BREATH: 0
FEVER: 0
DYSURIA: 0
HEARTBURN: 0
HEADACHES: 0
DIZZINESS: 0
PALPITATIONS: 0
NAUSEA: 0
HEMATURIA: 0
JOINT SWELLING: 1
NERVOUS/ANXIOUS: 0
WEAKNESS: 0
HEMATOCHEZIA: 0
CHILLS: 0
SORE THROAT: 0
MYALGIAS: 1
CONSTIPATION: 0
DIARRHEA: 0
ARTHRALGIAS: 1
FREQUENCY: 0
PARESTHESIAS: 0
ABDOMINAL PAIN: 0

## 2023-02-22 ASSESSMENT — SOCIAL DETERMINANTS OF HEALTH (SDOH)
HOW OFTEN DO YOU GET TOGETHER WITH FRIENDS OR RELATIVES?: THREE TIMES A WEEK
HOW OFTEN DO YOU GET TOGETHER WITH FRIENDS OR RELATIVES?: THREE TIMES A WEEK
IN A TYPICAL WEEK, HOW MANY TIMES DO YOU TALK ON THE PHONE WITH FAMILY, FRIENDS, OR NEIGHBORS?: MORE THAN THREE TIMES A WEEK
HOW OFTEN DO YOU ATTEND CHURCH OR RELIGIOUS SERVICES?: PATIENT DECLINED
DO YOU BELONG TO ANY CLUBS OR ORGANIZATIONS SUCH AS CHURCH GROUPS UNIONS, FRATERNAL OR ATHLETIC GROUPS, OR SCHOOL GROUPS?: PATIENT DECLINED
IN A TYPICAL WEEK, HOW MANY TIMES DO YOU TALK ON THE PHONE WITH FAMILY, FRIENDS, OR NEIGHBORS?: MORE THAN THREE TIMES A WEEK
DO YOU BELONG TO ANY CLUBS OR ORGANIZATIONS SUCH AS CHURCH GROUPS UNIONS, FRATERNAL OR ATHLETIC GROUPS, OR SCHOOL GROUPS?: PATIENT DECLINED
HOW OFTEN DO YOU ATTEND CHURCH OR RELIGIOUS SERVICES?: PATIENT DECLINED

## 2023-02-22 ASSESSMENT — ASTHMA QUESTIONNAIRES
QUESTION_4 LAST FOUR WEEKS HOW OFTEN HAVE YOU USED YOUR RESCUE INHALER OR NEBULIZER MEDICATION (SUCH AS ALBUTEROL): ONE OR TWO TIMES PER DAY
ACT_TOTALSCORE: 12
QUESTION_5 LAST FOUR WEEKS HOW WOULD YOU RATE YOUR ASTHMA CONTROL: SOMEWHAT CONTROLLED
ACT_TOTALSCORE: 12
QUESTION_3 LAST FOUR WEEKS HOW OFTEN DID YOUR ASTHMA SYMPTOMS (WHEEZING, COUGHING, SHORTNESS OF BREATH, CHEST TIGHTNESS OR PAIN) WAKE YOU UP AT NIGHT OR EARLIER THAN USUAL IN THE MORNING: FOUR OR MORE NIGHTS A WEEK
QUESTION_2 LAST FOUR WEEKS HOW OFTEN HAVE YOU HAD SHORTNESS OF BREATH: ONCE A DAY
QUESTION_1 LAST FOUR WEEKS HOW MUCH OF THE TIME DID YOUR ASTHMA KEEP YOU FROM GETTING AS MUCH DONE AT WORK, SCHOOL OR AT HOME: A LITTLE OF THE TIME

## 2023-02-22 NOTE — PROGRESS NOTES
"Jose is a 42 year old who is being evaluated via a billable video visit.      How would you like to obtain your AVS? MyChart  If the video visit is dropped, the invitation should be resent by: Text to cell phone: [phone]  Will anyone else be joining your video visit? No      Assessment & Plan     Moderate persistent asthma without complication  Recommend consult with allergy/asthma specialist.  Did not see benefit from Treglegy Ellipta, but states cost was too much too.   - albuterol (PROAIR HFA/PROVENTIL HFA/VENTOLIN HFA) 108 (90 Base) MCG/ACT inhaler; Inhale 2 puffs into the lungs every 4 hours as needed for shortness of breath or wheezing  - montelukast (SINGULAIR) 10 MG tablet; Take 1 tablet (10 mg) by mouth At Bedtime  - Adult Allergy/Asthma Referral; Future    Nasal congestion  Chronic. Recommend constul.   - Adult ENT  Referral; Future  BMI:   Estimated body mass index is 43.36 kg/m  as calculated from the following:    Height as of 11/14/22: 1.753 m (5' 9\").    Weight as of 11/14/22: 133.2 kg (293 lb 9.6 oz).           No follow-ups on file.    Mira Arboleda PA-C  [facility]    Subjective   Jose is a 42 year old, presenting for the following health issues:  Recheck Medication      HPI     Asthma Follow-Up    Was ACT completed today?    Yes    ACT Total Scores 2/22/2023   ACT TOTAL SCORE -   ASTHMA ER VISITS -   ASTHMA HOSPITALIZATIONS -   ACT TOTAL SCORE (Goal Greater than or Equal to 20) 12   In the past 12 months, how many times did you visit the emergency room for your asthma without being admitted to the hospital? 0   In the past 12 months, how many times were you hospitalized overnight because of your asthma? 0       How many days per week do you miss taking your asthma controller medication?  0    Please describe any recent triggers for your asthma: None    Have you had any Emergency Room Visits, Urgent Care Visits, or Hospital Admissions since your last " "office visit?  No          Review of Systems   Constitutional, HEENT, cardiovascular, pulmonary, gi and gu systems are negative, except as otherwise noted.      Objective    Vitals - Patient Reported  Weight (Patient Reported): 122.5 kg (270 lb)  Height (Patient Reported): 175.3 cm (5' 9\")  BMI (Based on Pt Reported Ht/Wt): 39.87      Vitals:  No vitals were obtained today due to virtual visit.    Physical Exam   GENERAL: Healthy, alert and no distress  EYES: Eyes grossly normal to inspection.  No discharge or erythema, or obvious scleral/conjunctival abnormalities.  RESP: No audible wheeze, cough, or visible cyanosis.  No visible retractions or increased work of breathing.    SKIN: Visible skin clear. No significant rash, abnormal pigmentation or lesions.  NEURO: Cranial nerves grossly intact.  Mentation and speech appropriate for age.  PSYCH: Mentation appears normal, affect normal/bright, judgement and insight intact, normal speech and appearance well-groomed.                Video-Visit Details    Type of service:  Video Visit     Originating Location (pt. Location): Home  Distant Location (provider location):  Off-site  Platform used for Video Visit: Rosemarie    "

## 2023-02-28 ENCOUNTER — OFFICE VISIT (OUTPATIENT)
Dept: FAMILY MEDICINE | Facility: CLINIC | Age: 43
End: 2023-02-28
Payer: COMMERCIAL

## 2023-02-28 VITALS
HEART RATE: 87 BPM | HEIGHT: 69 IN | WEIGHT: 296 LBS | SYSTOLIC BLOOD PRESSURE: 133 MMHG | RESPIRATION RATE: 20 BRPM | DIASTOLIC BLOOD PRESSURE: 70 MMHG | BODY MASS INDEX: 43.84 KG/M2 | OXYGEN SATURATION: 97 % | TEMPERATURE: 98.8 F

## 2023-02-28 DIAGNOSIS — E66.01 MORBID OBESITY (H): ICD-10-CM

## 2023-02-28 DIAGNOSIS — M25.561 RIGHT KNEE PAIN, UNSPECIFIED CHRONICITY: ICD-10-CM

## 2023-02-28 DIAGNOSIS — Z13.220 SCREENING FOR HYPERLIPIDEMIA: ICD-10-CM

## 2023-02-28 DIAGNOSIS — Z11.4 SCREENING FOR HIV (HUMAN IMMUNODEFICIENCY VIRUS): ICD-10-CM

## 2023-02-28 DIAGNOSIS — Z11.59 NEED FOR HEPATITIS C SCREENING TEST: ICD-10-CM

## 2023-02-28 DIAGNOSIS — R06.83 SNORING: ICD-10-CM

## 2023-02-28 DIAGNOSIS — Z01.818 PREOP GENERAL PHYSICAL EXAM: Primary | ICD-10-CM

## 2023-02-28 LAB
CHOLEST SERPL-MCNC: 173 MG/DL
ERYTHROCYTE [DISTWIDTH] IN BLOOD BY AUTOMATED COUNT: 13.5 % (ref 10–15)
HCT VFR BLD AUTO: 48.8 % (ref 40–53)
HDLC SERPL-MCNC: 43 MG/DL
HGB BLD-MCNC: 16.1 G/DL (ref 13.3–17.7)
LDLC SERPL CALC-MCNC: 118 MG/DL
MCH RBC QN AUTO: 30.7 PG (ref 26.5–33)
MCHC RBC AUTO-ENTMCNC: 33 G/DL (ref 31.5–36.5)
MCV RBC AUTO: 93 FL (ref 78–100)
NONHDLC SERPL-MCNC: 130 MG/DL
PLATELET # BLD AUTO: 280 10E3/UL (ref 150–450)
RBC # BLD AUTO: 5.25 10E6/UL (ref 4.4–5.9)
TRIGL SERPL-MCNC: 62 MG/DL
WBC # BLD AUTO: 8.4 10E3/UL (ref 4–11)

## 2023-02-28 PROCEDURE — 80061 LIPID PANEL: CPT | Performed by: PHYSICIAN ASSISTANT

## 2023-02-28 PROCEDURE — 85027 COMPLETE CBC AUTOMATED: CPT | Performed by: PHYSICIAN ASSISTANT

## 2023-02-28 PROCEDURE — 86803 HEPATITIS C AB TEST: CPT | Performed by: PHYSICIAN ASSISTANT

## 2023-02-28 PROCEDURE — 87389 HIV-1 AG W/HIV-1&-2 AB AG IA: CPT | Performed by: PHYSICIAN ASSISTANT

## 2023-02-28 PROCEDURE — 99214 OFFICE O/P EST MOD 30 MIN: CPT | Performed by: PHYSICIAN ASSISTANT

## 2023-02-28 PROCEDURE — 36415 COLL VENOUS BLD VENIPUNCTURE: CPT | Performed by: PHYSICIAN ASSISTANT

## 2023-02-28 ASSESSMENT — ASTHMA QUESTIONNAIRES
ACT_TOTALSCORE: 15
QUESTION_2 LAST FOUR WEEKS HOW OFTEN HAVE YOU HAD SHORTNESS OF BREATH: ONCE A DAY
QUESTION_1 LAST FOUR WEEKS HOW MUCH OF THE TIME DID YOUR ASTHMA KEEP YOU FROM GETTING AS MUCH DONE AT WORK, SCHOOL OR AT HOME: A LITTLE OF THE TIME
QUESTION_4 LAST FOUR WEEKS HOW OFTEN HAVE YOU USED YOUR RESCUE INHALER OR NEBULIZER MEDICATION (SUCH AS ALBUTEROL): ONE OR TWO TIMES PER DAY
QUESTION_5 LAST FOUR WEEKS HOW WOULD YOU RATE YOUR ASTHMA CONTROL: SOMEWHAT CONTROLLED
QUESTION_3 LAST FOUR WEEKS HOW OFTEN DID YOUR ASTHMA SYMPTOMS (WHEEZING, COUGHING, SHORTNESS OF BREATH, CHEST TIGHTNESS OR PAIN) WAKE YOU UP AT NIGHT OR EARLIER THAN USUAL IN THE MORNING: ONCE OR TWICE
ACT_TOTALSCORE: 15

## 2023-02-28 NOTE — PROGRESS NOTES
[facility]  [id number] [place] [id number]  Phone: [phone]  Primary Provider: Mira Heath  Pre-op Performing Provider: MIRA HEATH      PREOPERATIVE EVALUATION:  Today's date: 2/28/2023    Mario Delgado is a 42 year old male who presents for a preoperative evaluation.    Surgical Information:  Surgery/Procedure: Right knee repair, torn menicus, cyst of knee  Surgery Location: [facility] [place] location  Surgeon: Dr. Radha Renner  Surgery Date: 03/08/2023  Time of Surgery: AM unknown time  Where patient plans to recover: At home with family  Fax number for surgical facility: Please Fax to: [phone]    Type of Anesthesia Anticipated: General    Assessment & Plan     The proposed surgical procedure is considered INTERMEDIATE risk.    Preop general physical exam    - CBC with platelets; Future  - CBC with platelets    Morbid obesity (H)  Diet and exercise    Right knee pain, unspecified chronicity      Snoring  Needs new referral   - Adult Sleep Eval & Management  Referral; Future    Screening for HIV (human immunodeficiency virus)    - HIV Antigen Antibody Combo; Future  - HIV Antigen Antibody Combo    Need for hepatitis C screening test    - Hepatitis C Screen Reflex to HCV RNA Quant and Genotype; Future  - Hepatitis C Screen Reflex to HCV RNA Quant and Genotype    Screening for hyperlipidemia    - Lipid panel reflex to direct LDL Non-fasting; Future  - Lipid panel reflex to direct LDL Non-fasting           Risks and Recommendations:  The patient has the following additional risks and recommendations for perioperative complications:   - Morbid obesity (BMI >40)    Medication Instructions:   - LABA, inhaled corticosteroid, long-acting anticholinergics: Continue without modification.   - rescue Inhaler: Continue PRN. Bring to hospital on the day of surgery.    RECOMMENDATION:  APPROVAL GIVEN to proceed with proposed  procedure, without further diagnostic evaluation.            Subjective     HPI related to upcoming procedure: right knee pain    Preop Questions 2/28/2023   1. Have you ever had a heart attack or stroke? No   2. Have you ever had surgery on your heart or blood vessels, such as a stent placement, a coronary artery bypass, or surgery on an artery in your head, neck, heart, or legs? No   3. Do you have chest pain with activity? No   4. Do you have a history of  heart failure? No   5. Do you currently have a cold, bronchitis or symptoms of other infection? No   6. Do you have a cough, shortness of breath, or wheezing? No   7. Do you or anyone in your family have previous history of blood clots? No   8. Do you or does anyone in your family have a serious bleeding problem such as prolonged bleeding following surgeries or cuts? No   9. Have you ever had problems with anemia or been told to take iron pills? No   10. Have you had any abnormal blood loss such as black, tarry or bloody stools? No   11. Have you ever had a blood transfusion? No   12. Are you willing to have a blood transfusion if it is medically needed before, during, or after your surgery? Yes   13. Have you or any of your relatives ever had problems with anesthesia? No   14. Do you have sleep apnea, excessive snoring or daytime drowsiness? YES -    14a. Do you have a CPAP machine? No   15. Do you have any artifical heart valves or other implanted medical devices like a pacemaker, defibrillator, or continuous glucose monitor? No   16. Do you have artificial joints? No   17. Are you allergic to latex? No       Health Care Directive:  Patient does not have a Health Care Directive or Living Will: Discussed advance care planning with patient; however, patient declined at this time.    Preoperative Review of :   reviewed - no record of controlled substances prescribed.      Status of Chronic Conditions:  ASTHMA - Patient has a longstanding history of  moderate-severe Asthma . Patient has been doing well overall noting NO SYMPTOMS and continues on medication regimen consisting of  Advair and albuterol without adverse reactions or side effects.       Review of Systems  Constitutional, neuro, ENT, endocrine, pulmonary, cardiac, gastrointestinal, genitourinary, musculoskeletal, integument and psychiatric systems are negative, except as otherwise noted.    Patient Active Problem List    Diagnosis Date Noted     Seasonal allergic rhinitis 12/10/2010     Priority: Medium     Moderate persistent asthma 12/10/2010     Priority: Medium     CARDIOVASCULAR SCREENING; LDL GOAL LESS THAN 160 10/31/2010     Priority: Medium      Past Medical History:   Diagnosis Date     Mild intermittent asthma      Past Surgical History:   Procedure Laterality Date     ENT SURGERY      Nasal Fracture     NO HISTORY OF SURGERY       Current Outpatient Medications   Medication Sig Dispense Refill     albuterol (PROAIR HFA/PROVENTIL HFA/VENTOLIN HFA) 108 (90 Base) MCG/ACT inhaler Inhale 2 puffs into the lungs every 4 hours as needed for shortness of breath or wheezing 36 g 4     fluticasone-salmeterol (ADVAIR) 250-50 MCG/ACT inhaler Inhale 1 puff into the lungs 2 times daily 60 each 11     montelukast (SINGULAIR) 10 MG tablet Take 1 tablet (10 mg) by mouth At Bedtime 90 tablet 3     traZODone (DESYREL) 50 MG tablet Take 1-2 tablets ( mg) by mouth At Bedtime 60 tablet 3       Allergies   Allergen Reactions     No Known Drug Allergies      Pollen Extract         Social History     Tobacco Use     Smoking status: Never     Smokeless tobacco: Former     Types: Chew     Quit date: 2016   Substance Use Topics     Alcohol use: Yes     Comment: 3-4 drinks 1-2x/month     Family History   Problem Relation Age of Onset     Sleep Apnea Father      Asthma Mother      Family History Negative No family hx of      History   Drug Use No         Objective     /70 (BP Location: Right arm, Patient  "Position: Sitting, Cuff Size: Adult Large)   Pulse 87   Temp 98.8  F (37.1  C) (Oral)   Resp 20   Ht 1.753 m (5' 9\")   Wt 134.3 kg (296 lb)   SpO2 97%   BMI 43.71 kg/m      Physical Exam    GENERAL APPEARANCE: healthy, alert and no distress     EYES: EOMI,  PERRL     HENT: ear canals and TM's normal and nose and mouth without ulcers or lesions     NECK: no adenopathy, no asymmetry, masses, or scars and thyroid normal to palpation     RESP: lungs clear to auscultation - no rales, rhonchi or wheezes     CV: regular rates and rhythm, normal S1 S2, no S3 or S4 and no murmur, click or rub     ABDOMEN:  soft, nontender, no HSM or masses and bowel sounds normal     MS: extremities normal- no gross deformities noted, no evidence of inflammation in joints, FROM in all extremities.     SKIN: no suspicious lesions or rashes     NEURO: Normal strength and tone, sensory exam grossly normal, mentation intact and speech normal     PSYCH: mentation appears normal. and affect normal/bright     LYMPHATICS: No cervical adenopathy    No results for input(s): HGB, PLT, INR, NA, POTASSIUM, CR, A1C in the last 93068 hours.     Diagnostics:  Recent Results (from the past 24 hour(s))   CBC with platelets    Collection Time: 02/28/23 11:23 AM   Result Value Ref Range    WBC Count 8.4 4.0 - 11.0 10e3/uL    RBC Count 5.25 4.40 - 5.90 10e6/uL    Hemoglobin 16.1 13.3 - 17.7 g/dL    Hematocrit 48.8 40.0 - 53.0 %    MCV 93 78 - 100 fL    MCH 30.7 26.5 - 33.0 pg    MCHC 33.0 31.5 - 36.5 g/dL    RDW 13.5 10.0 - 15.0 %    Platelet Count 280 150 - 450 10e3/uL      No EKG required, no history of coronary heart disease, significant arrhythmia, peripheral arterial disease or other structural heart disease.    Revised Cardiac Risk Index (RCRI):  The patient has the following serious cardiovascular risks for perioperative complications:   - No serious cardiac risks = 0 points     RCRI Interpretation: 0 points: Class I (very low risk - 0.4% " complication rate)           Signed Electronically by: Mira Arboleda PA-C  Copy of this evaluation report is provided to requesting physician.

## 2023-03-01 LAB
HCV AB SERPL QL IA: NONREACTIVE
HIV 1+2 AB+HIV1 P24 AG SERPL QL IA: NONREACTIVE

## 2023-03-22 ENCOUNTER — TRANSFERRED RECORDS (OUTPATIENT)
Dept: HEALTH INFORMATION MANAGEMENT | Facility: CLINIC | Age: 43
End: 2023-03-22
Payer: COMMERCIAL

## 2023-03-26 ENCOUNTER — HEALTH MAINTENANCE LETTER (OUTPATIENT)
Age: 43
End: 2023-03-26

## 2023-09-14 ENCOUNTER — TELEPHONE (OUTPATIENT)
Dept: FAMILY MEDICINE | Facility: CLINIC | Age: 43
End: 2023-09-14
Payer: COMMERCIAL

## 2023-09-14 NOTE — TELEPHONE ENCOUNTER
Patient Quality Outreach    Patient is due for the following:   Asthma  -  ACT needed    Next Steps:   Complete ACT    Type of outreach:    Sent [email or web address] message.      Questions for provider review:    None           Dary Mccollum, CMA

## 2024-01-16 ENCOUNTER — HOSPITAL ENCOUNTER (OUTPATIENT)
Dept: ULTRASOUND IMAGING | Facility: CLINIC | Age: 44
Discharge: HOME | End: 2024-01-16
Attending: NURSE PRACTITIONER | Admitting: NURSE PRACTITIONER
Payer: COMMERCIAL

## 2024-01-16 ENCOUNTER — NURSE TRIAGE (OUTPATIENT)
Dept: FAMILY MEDICINE | Facility: CLINIC | Age: 44
End: 2024-01-16

## 2024-01-16 ENCOUNTER — OFFICE VISIT (OUTPATIENT)
Dept: PEDIATRICS | Facility: CLINIC | Age: 44
End: 2024-01-16
Payer: COMMERCIAL

## 2024-01-16 VITALS
RESPIRATION RATE: 18 BRPM | TEMPERATURE: 97.8 F | OXYGEN SATURATION: 96 % | HEART RATE: 96 BPM | BODY MASS INDEX: 45.48 KG/M2 | WEIGHT: 308 LBS | DIASTOLIC BLOOD PRESSURE: 97 MMHG | SYSTOLIC BLOOD PRESSURE: 171 MMHG

## 2024-01-16 DIAGNOSIS — M79.662 PAIN IN LEFT LOWER LEG: ICD-10-CM

## 2024-01-16 DIAGNOSIS — L03.119 CELLULITIS AND ABSCESS OF LEG: ICD-10-CM

## 2024-01-16 DIAGNOSIS — R60.0 EDEMA OF LEFT LOWER LEG: Primary | ICD-10-CM

## 2024-01-16 DIAGNOSIS — R60.0 EDEMA OF RIGHT LOWER EXTREMITY: ICD-10-CM

## 2024-01-16 DIAGNOSIS — M79.604 PAIN OF RIGHT LOWER EXTREMITY: ICD-10-CM

## 2024-01-16 DIAGNOSIS — R03.0 ELEVATED BP WITHOUT DIAGNOSIS OF HYPERTENSION: ICD-10-CM

## 2024-01-16 DIAGNOSIS — L02.419 CELLULITIS AND ABSCESS OF LEG: ICD-10-CM

## 2024-01-16 PROCEDURE — 93971 EXTREMITY STUDY: CPT | Mod: LT

## 2024-01-16 PROCEDURE — 99417 PROLNG OP E/M EACH 15 MIN: CPT | Performed by: NURSE PRACTITIONER

## 2024-01-16 PROCEDURE — 99215 OFFICE O/P EST HI 40 MIN: CPT | Performed by: NURSE PRACTITIONER

## 2024-01-16 PROCEDURE — 87070 CULTURE OTHR SPECIMN AEROBIC: CPT | Performed by: NURSE PRACTITIONER

## 2024-01-16 PROCEDURE — 99000 SPECIMEN HANDLING OFFICE-LAB: CPT | Performed by: NURSE PRACTITIONER

## 2024-01-16 PROCEDURE — 87205 SMEAR GRAM STAIN: CPT | Performed by: NURSE PRACTITIONER

## 2024-01-16 PROCEDURE — 87077 CULTURE AEROBIC IDENTIFY: CPT | Performed by: NURSE PRACTITIONER

## 2024-01-16 PROCEDURE — 87186 SC STD MICRODIL/AGAR DIL: CPT | Performed by: NURSE PRACTITIONER

## 2024-01-16 PROCEDURE — 87252 VIRUS INOCULATION TISSUE: CPT | Mod: 90 | Performed by: NURSE PRACTITIONER

## 2024-01-16 RX ORDER — CEPHALEXIN 500 MG/1
500 CAPSULE ORAL 2 TIMES DAILY
Qty: 14 CAPSULE | Refills: 0 | Status: SHIPPED | OUTPATIENT
Start: 2024-01-16 | End: 2024-01-23

## 2024-01-16 NOTE — TELEPHONE ENCOUNTER
Pt calls, see triage note, agrees to AD[facility], routed FYI to     Referral to Acute and Diagnostic Services    [phone] ([place]) [place] [address], [address], [place] [address]    Transition to Acute & Diagnostic Services Clinic has been discussed with patient, and he agrees with next level of care.   Patient understands that evaluation/treatment at [facility] typically takes significantly longer than in clinic/urgent care (>2 hours).  The [facility]ostics [facility] has been contacted by provider/staff to confirm patient acceptance.     Special issues:                            Nurse Triage SBAR    Is this a 2nd Level Triage? YES, LICENSED PRACTITIONER REVIEW IS REQUIRED    Situation: left leg painful and swollen    Background: see triage note, pt informs issue started 1/12/24, getting worse, painful to touch, especially calf area, denies fever or drainage, moderate edema    Assessment: rule out DVT    Protocol Recommended Disposition:   Go To Office Now    Recommendation: will call [facility] about same day appointment     Does the patient meet one of the following criteria for [facility] visit consideration? 16+ years old, with an [facility] PCP     TIP  Providers, please consider if this condition is appropriate for management at one of our Acute and Diagnostic Services sites.     If patient is a good candidate, please use dotphrase <dot>triageresponse and select Refer to [facility] to document.      Reason for Disposition   Thigh or calf pain and only 1 side and present > 1 hour    Additional Information   Negative: Sounds like a life-threatening emergency to the triager   Negative: Chest pain   Negative: Followed an insect bite and has localized swelling (e.g., small area of puffy or swollen skin)   Negative: Followed a knee injury   Negative: Ankle or foot injury   Negative: Pregnant with leg swelling or edema   Negative: Difficulty breathing at rest   Negative: Entire foot is cool or blue  "in comparison to other side   Negative: SEVERE swelling (e.g., swelling extends above knee, entire leg is swollen, weeping fluid)   Negative: Cast on leg or ankle and has increasing pain   Negative: Can't walk or can barely stand (new-onset)   Negative: Fever and red area (or area very tender to touch)   Negative: Patient sounds very sick or weak to the triager   Negative: Swelling of face, arm or hands  (Exception: Slight puffiness of fingers during hot weather.)   Negative: Pregnant 20 or more weeks and sudden weight gain (i.e., > 2 lbs, 1 kg in one week)    Answer Assessment - Initial Assessment Questions  1. ONSET: \"When did the swelling start?\" (e.g., minutes, hours, days)      1/12/24 am  2. LOCATION: \"What part of the leg is swollen?\"  \"Are both legs swollen or just one leg?\"      Left leg, inner thigh and down the calf  3. SEVERITY: \"How bad is the swelling?\" (e.g., localized; mild, moderate, severe)    - Localized: Small area of swelling localized to one leg.    - MILD pedal edema: Swelling limited to foot and ankle, pitting edema < 1/4 inch (6 mm) deep, rest and elevation eliminate most or all swelling.    - MODERATE edema: Swelling of lower leg to knee, pitting edema > 1/4 inch (6 mm) deep, rest and elevation only partially reduce swelling.    - SEVERE edema: Swelling extends above knee, facial or hand swelling present.       Moderate  4. REDNESS: \"Does the swelling look red or infected?\"      Yes, around calf  5. PAIN: \"Is the swelling painful to touch?\" If Yes, ask: \"How painful is it?\"   (Scale 1-10; mild, moderate or severe)      Yes, especially calf   6. FEVER: \"Do you have a fever?\" If Yes, ask: \"What is it, how was it measured, and when did it start?\"       No   7. CAUSE: \"What do you think is causing the leg swelling?\"      Not sure   8. MEDICAL HISTORY: \"Do you have a history of blood clots (e.g., DVT), cancer, heart failure, kidney disease, or liver failure?\"      No, No  9. RECURRENT SYMPTOM: " "\"Have you had leg swelling before?\" If Yes, ask: \"When was the last time?\" \"What happened that time?\"      No  10. OTHER SYMPTOMS: \"Do you have any other symptoms?\" (e.g., chest pain, difficulty breathing)        No  11. PREGNANCY: \"Is there any chance you are pregnant?\" \"When was your last menstrual period?\"        N/a    Protocols used: Leg Swelling and Edema-A-OH    Sudha Sutton RN, BSN  [facility]    "

## 2024-01-16 NOTE — PROGRESS NOTES
Assessment & Plan     Edema of left lower leg  - Viral Culture Non-respiratory; Future  - Swab Aerobic Bacterial Culture Routine With Gram Stain  - Viral Culture Non-respiratory  - cephALEXin (KEFLEX) 500 MG capsule; Take 1 capsule (500 mg) by mouth 2 times daily for 7 days    Pain in left lower leg  - Viral Culture Non-respiratory; Future  - Swab Aerobic Bacterial Culture Routine With Gram Stain  - Viral Culture Non-respiratory  - cephALEXin (KEFLEX) 500 MG capsule; Take 1 capsule (500 mg) by mouth 2 times daily for 7 days    Cellulitis and abscess of leg  - cephALEXin (KEFLEX) 500 MG capsule; Take 1 capsule (500 mg) by mouth 2 times daily for 7 days    Elevated BP without diagnosis of hypertension    Patient Instructions     Results for orders placed or performed during the hospital encounter of 01/16/24   US Lower Extremity Venous Duplex Left     Status: None (Preliminary result)    Narrative    VENOUS ULTRASOUND LEFT LOWER EXTREMITY  1/16/2024 10:45 AM     HISTORY: Edema of right lower extremity. Pain of right lower  extremity.    COMPARISON: None.    TECHNIQUE: Color Doppler and spectral waveform analysis performed  throughout the deep veins of the left lower extremity.    FINDINGS: The left common femoral, proximal great saphenous, femoral,  and popliteal veins demonstrate normal blood flow, compression, and  augmentation. Posterior tibial and peroneal veins are compressible.  Contralateral right common femoral vein is patent.      Impression    IMPRESSION: Negative for deep venous thrombosis in the left lower  extremity.     The US is negative for DVT.    Start keflex for cellulitis.   Avoid itching   Use topical cortisone if really itchy  Benadryl at night.      BP markedly elevated.    BP uncontrolled.    Try to reduce sodium in your diet.    Will monitor at home  recommend follow up with PCP if remains elevated.    68 min with pt and more than 50% of the time was spent in counseling and coordination of  "care of the above issues       BMI:   Estimated body mass index is 45.48 kg/m  as calculated from the following:    Height as of 2/28/23: 1.753 m (5' 9\").    Weight as of this encounter: 139.7 kg (308 lb).     Return in about 1 week (around 1/23/2024) for with regular provider if symptoms persist.    Ileana Randa Jesus, APRN CNP  [facility] ROSA Garcia is a 43 year old, presenting for the following health issues:  Leg Pain (Left calf pain, edema, rash X 6 days)      HPI     Evaluation for possible DVT  Onset/Duration: X 6 days  Description:       Location: Left calf area, lower extremity       Redness: no        Pain: 6/10       Warmth: YES- slight       Joint swelling YES  Progression of symptoms same  Accompanying signs and symptoms:       Fevers: no        Numbness/tingling/weakness: YES- tingling when waking up and getting out of bed, also some weakness noted until he gets up and start moving around       Chest pain/pleurisy: no        Shortness of breath: no   History        Trauma: no         Recent travel/when: no         Previous history of DVT: no         Family history of DVT: no         Recent surgery: no   Aggravating factors include: sitting  Therapies tried and outcome: ice and elevating, salt soaks, none of this helped, usually does when legs swell.  Walking improves pain, sitting too long, \"gets stiff\"  Prior surgery on arteries of veins in this area: No   Sent to [facility] to r/o DVT.        Review of Systems   Constitutional, HEENT, cardiovascular, pulmonary, GI, , musculoskeletal, neuro, skin, endocrine and psych systems are negative, except as otherwise noted.      Objective    BP (!) 175/122 (BP Location: Left arm, Patient Position: Chair, Cuff Size: Adult Large)   Pulse 96   Temp 97.8  F (36.6  C) (Oral)   Resp 18   Wt 139.7 kg (308 lb)   SpO2 96%   BMI 45.48 kg/m    Body mass index is 45.48 kg/m .  Physical Exam   GENERAL: healthy, alert and no " distress  RESP: lungs clear to auscultation - no rales, rhonchi or wheezes  CV: regular rate and rhythm, normal S1 S2, no S3 or S4, no murmur, click or rub, no peripheral edema and peripheral pulses strong  MS: 3+ pitting  edema to left lower leg to the knee and tenderness to palpation anterior and posterior left lower leg  SKIN: erythema - lower leg, left and MANY scattered 5mm-10 mm scabs entire left lower leg.  Lateral left lower leg with large crusted scab approx 10x 15 cm with surrounding erythema

## 2024-01-16 NOTE — PATIENT INSTRUCTIONS
Results for orders placed or performed during the hospital encounter of 01/16/24   US Lower Extremity Venous Duplex Left     Status: None (Preliminary result)    Narrative    VENOUS ULTRASOUND LEFT LOWER EXTREMITY  1/16/2024 10:45 AM     HISTORY: Edema of right lower extremity. Pain of right lower  extremity.    COMPARISON: None.    TECHNIQUE: Color Doppler and spectral waveform analysis performed  throughout the deep veins of the left lower extremity.    FINDINGS: The left common femoral, proximal great saphenous, femoral,  and popliteal veins demonstrate normal blood flow, compression, and  augmentation. Posterior tibial and peroneal veins are compressible.  Contralateral right common femoral vein is patent.      Impression    IMPRESSION: Negative for deep venous thrombosis in the left lower  extremity.     The US is negative for DVT.    Start keflex for cellulitis.   Avoid itching   Use topical cortisone if really itchy  Benadryl at night.      BP markedly elevated.    BP uncontrolled.    Try to reduce sodium in your diet.    Will monitor at home  recommend follow up with PCP if remains elevated.

## 2024-01-18 LAB
BACTERIA SPEC CULT: ABNORMAL
GRAM STAIN RESULT: ABNORMAL
GRAM STAIN RESULT: ABNORMAL

## 2024-01-20 ENCOUNTER — MYC MEDICAL ADVICE (OUTPATIENT)
Dept: FAMILY MEDICINE | Facility: CLINIC | Age: 44
End: 2024-01-20
Payer: COMMERCIAL

## 2024-01-21 ENCOUNTER — NURSE TRIAGE (OUTPATIENT)
Dept: NURSING | Facility: CLINIC | Age: 44
End: 2024-01-21
Payer: COMMERCIAL

## 2024-01-21 NOTE — TELEPHONE ENCOUNTER
The patient is complaining of a rash that is spreading to both lower legs, his abdomen, arms, and trunk area.  He reports the rash is itchy and he thinks it is shingles.  The rash eruptions were observed four days after the start of Cephalexin.   He is being treated for a gram positive cellulitis on his left lower leg  He denies any difficulty breathing, hives, hoarseness, cough, or swollen tongue  Triage guidelines recommend to see pcp within 24 hours  Caller verbalized and understands directives    Reason for Disposition   [1] Widespread rash AND [2] drug rash suspected (i.e., allergic reaction to antibiotic)   Taking new prescription antibiotic  (Exception: Finished taking new prescription antibiotic.)    Additional Information   Negative: Shock suspected (e.g., cold/pale/clammy skin, too weak to stand, low BP, rapid pulse)   Negative: Sounds like a life-threatening emergency to the triager   Negative:  surgical wound infection suspected (post-op)   Negative: Surgical wound infection suspected (post-op)   Negative: [1] Life-threatening reaction (anaphylaxis) in the past to the same drug AND [2] < 2 hours since exposure   Negative: Difficulty breathing or wheezing   Negative: [1] Hoarseness or cough AND [2] started soon after 1st dose of drug   Negative: [1] Swollen tongue AND [2] started soon after 1st dose of drug   Negative: [1] Purple or blood-colored rash (spots or dots) AND [2] fever   Negative: Sounds like a life-threatening emergency to the triager   Negative: Rash is only on 1 part of the body (localized)   Negative: Taking new non-prescription (OTC) antihistamine, decongestant, ear drops, eye drops, or other OTC cough/cold medicine   Negative: Taking new prescription antihistamine, allergy medicine, asthma medicine, eye drops, ear drops or nose drops   Negative: Rash started more than 3 days after stopping new prescription medicine   Negative: Swollen tongue   Negative: [1] Widespread hives AND  [2] onset < 2 hours of exposure to 1st dose of drug   Negative: Fever   Negative: Patient sounds very sick or weak to the triager   Negative: [1] Purple or blood-colored rash (spots or dots) AND [2] no fever AND [3] sounds well to triager   Negative: [1] Taking new prescription medication AND [2] rash within 4 hours of 1st dose   Negative: Large or small blisters on skin (i.e., fluid filled bubbles or sacs)   Negative: Bloody crusts on lips or sores in mouth   Negative: Face or lip swelling   Negative: Hives or itching    Protocols used: Cellulitis on Antibiotic Follow-up Call-A-AH, Rash - Widespread On Drugs-A-

## 2024-01-22 ENCOUNTER — OFFICE VISIT (OUTPATIENT)
Dept: DERMATOLOGY | Facility: CLINIC | Age: 44
End: 2024-01-22
Payer: COMMERCIAL

## 2024-01-22 ENCOUNTER — OFFICE VISIT (OUTPATIENT)
Dept: FAMILY MEDICINE | Facility: CLINIC | Age: 44
End: 2024-01-22
Payer: COMMERCIAL

## 2024-01-22 DIAGNOSIS — E66.01 MORBID OBESITY (H): ICD-10-CM

## 2024-01-22 DIAGNOSIS — Z87.2 HISTORY OF CELLULITIS: ICD-10-CM

## 2024-01-22 DIAGNOSIS — I77.6 VASCULITIS (H): Primary | ICD-10-CM

## 2024-01-22 LAB
ALBUMIN SERPL BCG-MCNC: 4.2 G/DL (ref 3.5–5.2)
ALBUMIN UR-MCNC: ABNORMAL MG/DL
ALP SERPL-CCNC: 69 U/L (ref 40–150)
ALT SERPL W P-5'-P-CCNC: 38 U/L (ref 0–70)
ANION GAP SERPL CALCULATED.3IONS-SCNC: 14 MMOL/L (ref 7–15)
APPEARANCE UR: CLEAR
AST SERPL W P-5'-P-CCNC: 23 U/L (ref 0–45)
BACTERIA #/AREA URNS HPF: ABNORMAL /HPF
BILIRUB SERPL-MCNC: 0.5 MG/DL
BILIRUB UR QL STRIP: NEGATIVE
BUN SERPL-MCNC: 10.2 MG/DL (ref 6–20)
CALCIUM SERPL-MCNC: 9.3 MG/DL (ref 8.6–10)
CHLORIDE SERPL-SCNC: 101 MMOL/L (ref 98–107)
COLOR UR AUTO: YELLOW
CREAT SERPL-MCNC: 1.07 MG/DL (ref 0.67–1.17)
DEPRECATED HCO3 PLAS-SCNC: 25 MMOL/L (ref 22–29)
EGFRCR SERPLBLD CKD-EPI 2021: 87 ML/MIN/1.73M2
GLUCOSE SERPL-MCNC: 128 MG/DL (ref 70–99)
GLUCOSE UR STRIP-MCNC: NEGATIVE MG/DL
HGB UR QL STRIP: ABNORMAL
KETONES UR STRIP-MCNC: NEGATIVE MG/DL
LEUKOCYTE ESTERASE UR QL STRIP: NEGATIVE
MUCOUS THREADS #/AREA URNS LPF: PRESENT /LPF
NITRATE UR QL: NEGATIVE
PH UR STRIP: 6 [PH] (ref 5–7)
POTASSIUM SERPL-SCNC: 4.4 MMOL/L (ref 3.4–5.3)
PROT SERPL-MCNC: 6.9 G/DL (ref 6.4–8.3)
RBC #/AREA URNS AUTO: ABNORMAL /HPF
SODIUM SERPL-SCNC: 140 MMOL/L (ref 135–145)
SP GR UR STRIP: >=1.03 (ref 1–1.03)
SQUAMOUS #/AREA URNS AUTO: ABNORMAL /LPF
UROBILINOGEN UR STRIP-ACNC: 0.2 E.U./DL
WBC #/AREA URNS AUTO: ABNORMAL /HPF

## 2024-01-22 PROCEDURE — 86225 DNA ANTIBODY NATIVE: CPT | Performed by: DERMATOLOGY

## 2024-01-22 PROCEDURE — 86038 ANTINUCLEAR ANTIBODIES: CPT | Performed by: DERMATOLOGY

## 2024-01-22 PROCEDURE — 11102 TANGNTL BX SKIN SINGLE LES: CPT | Performed by: DERMATOLOGY

## 2024-01-22 PROCEDURE — 99000 SPECIMEN HANDLING OFFICE-LAB: CPT | Performed by: DERMATOLOGY

## 2024-01-22 PROCEDURE — 83516 IMMUNOASSAY NONANTIBODY: CPT | Performed by: DERMATOLOGY

## 2024-01-22 PROCEDURE — 80053 COMPREHEN METABOLIC PANEL: CPT | Performed by: DERMATOLOGY

## 2024-01-22 PROCEDURE — 36415 COLL VENOUS BLD VENIPUNCTURE: CPT | Performed by: DERMATOLOGY

## 2024-01-22 PROCEDURE — 86235 NUCLEAR ANTIGEN ANTIBODY: CPT | Performed by: DERMATOLOGY

## 2024-01-22 PROCEDURE — 99243 OFF/OP CNSLTJ NEW/EST LOW 30: CPT | Mod: 25 | Performed by: DERMATOLOGY

## 2024-01-22 PROCEDURE — 88350 IMFLUOR EA ADDL 1ANTB STN PX: CPT | Performed by: DERMATOLOGY

## 2024-01-22 PROCEDURE — 99214 OFFICE O/P EST MOD 30 MIN: CPT | Performed by: STUDENT IN AN ORGANIZED HEALTH CARE EDUCATION/TRAINING PROGRAM

## 2024-01-22 PROCEDURE — 88305 TISSUE EXAM BY PATHOLOGIST: CPT | Performed by: DERMATOLOGY

## 2024-01-22 PROCEDURE — 81001 URINALYSIS AUTO W/SCOPE: CPT | Performed by: DERMATOLOGY

## 2024-01-22 PROCEDURE — 88346 IMFLUOR 1ST 1ANTB STAIN PX: CPT | Performed by: DERMATOLOGY

## 2024-01-22 PROCEDURE — 83876 ASSAY MYELOPEROXIDASE: CPT | Performed by: DERMATOLOGY

## 2024-01-22 PROCEDURE — 86060 ANTISTREPTOLYSIN O TITER: CPT | Mod: 90 | Performed by: DERMATOLOGY

## 2024-01-22 ASSESSMENT — PAIN SCALES - GENERAL: PAINLEVEL: NO PAIN (0)

## 2024-01-22 NOTE — PROGRESS NOTES
"  Assessment & Plan     Vasculitis (H24)  History of cellulitis  Morbid obesity (H)  New, progressive cutaneous small vessel vasculitis. Now with evidence of hemorrhagic papules/necrotic tissue. Recently treated with cephalexin for presumed cellulitis. Antibiotic treatment stopped yesterday due to concern of rash. Unclear etiology of vasculitis and whether it was present prior to treatment of cellulitis or not. May be 2/2 drug exposure but would expect longer delay between drug exposure and drug reaction unless treated with cephalexin in past.     Plan to refer to Dermatology, same day appointment. Will defer to Derm regarding labs/steroid/abx requirements.     Of note, patient visit during electrical outage with limited access to EMR.     BMI  Estimated body mass index is 45.48 kg/m  as calculated from the following:    Height as of 2/28/23: 1.753 m (5' 9\").    Weight as of 1/16/24: 139.7 kg (308 lb).     Follow up raghavn    Al Alston MD  [facility], [place]  1/22/2024      Kelly Garcia is a 43 year old, presenting for the following health issues:  No chief complaint on file.    HPI     Left leg rash developed initially. This worsened over the course of 6 days and was sent to [facility] for further investigation.  Dx with cellulitis and given abx to treat. Took for 5-6 days but stopped due to progressive rash on Sunday (yesterday)  Since being seen, rash has progressed up the legs and into abdomen and now onto the arms.  No significant itching, has used oatmeal bath and calamine to treat. Is a little painful, mostly in the L lower leg.  No fevers, no easy bruising or bleeding.         Objective    There were no vitals taken for this visit.  There is no height or weight on file to calculate BMI.    Physical Exam   GENERAL: healthy, alert and no distress  HEAD: Normocephalic, atraumatic.   EYES: Normal conjunctivae, sclera.   RESP: Normal respiratory effort.  MSK: no gross musculoskeletal defects " noted.  SKIN:  Scattered to confluent erythematous/hemorrhagic papules/plaques without blanching in LE bilaterally and extending up to mid-abdomen (see images). Pinpoint erythematous papules over UE bilaterally. 10 cm patch of erythematous, edematous and warm skin on lateral aspect of LLE with central eschar present.        EXT: Warm and well perfused. 1+ pitting edema in LE bilaterally up to knees.  NEURO: CNII-XII grossly intact. No focal deficits.  PSYCH: Groomed, dressed appropriately for weather. Normal mood with consistent affect.     Signed Electronically by: Al Alston MD

## 2024-01-22 NOTE — PATIENT INSTRUCTIONS
Please start taking (1) Claritin in AM and (1) zyrtec at bed to help with itching.     I will notify you as soon as we get results.

## 2024-01-22 NOTE — LETTER
1/22/2024         RE: Mario Delgado  [address] [facility] [address]        Dear Colleague,    Thank you for referring your patient, Mario Delgado, to the [facility]. Please see a copy of my visit note below.    Mario Delgado , a 43 year old year old male patient, I was asked to see by Dr. Alston for rash.  Patient has no other skin complaints today.  Remainder of the HPI, Meds, PMH, Allergies, FH, and SH was reviewed in chart.      Past Medical History:   Diagnosis Date     Mild intermittent asthma        Past Surgical History:   Procedure Laterality Date     ENT SURGERY      Nasal Fracture     NO HISTORY OF SURGERY          Family History   Problem Relation Age of Onset     Sleep Apnea Father      Asthma Mother      Family History Negative No family hx of        Social History     Socioeconomic History     Marital status: Single     Spouse name: Not on file     Number of children: Not on file     Years of education: Not on file     Highest education level: Not on file   Occupational History     Not on file   Tobacco Use     Smoking status: Never     Smokeless tobacco: Former     Types: Chew     Quit date: 2016   Vaping Use     Vaping Use: Never used   Substance and Sexual Activity     Alcohol use: Yes     Comment: 3-4 drinks 1-2x/month     Drug use: No     Sexual activity: Yes     Partners: Female   Other Topics Concern     Parent/sibling w/ CABG, MI or angioplasty before 65F 55M? No   Social History Narrative     Not on file     Social Determinants of Health     Financial Resource Strain: Low Risk  (2/22/2023)    Overall Financial Resource Strain (CARDIA)      Difficulty of Paying Living Expenses: Not hard at all   Food Insecurity: No Food Insecurity (2/22/2023)    Hunger Vital Sign      Worried About Running Out of Food in the Last Year: Never true      Ran Out of Food in the Last Year: Never true   Transportation Needs: No Transportation Needs (2/22/2023)    PRAPARE -  Transportation      Lack of Transportation (Medical): No      Lack of Transportation (Non-Medical): No   Physical Activity: Unknown (2/22/2023)    Exercise Vital Sign      Days of Exercise per Week: 3 days      Minutes of Exercise per Session: Patient declined   Stress: No Stress Concern Present (2/22/2023)    [employer] - Occupational Stress Questionnaire      Feeling of Stress : Only a little   Social Connections: Unknown (2/22/2023)    Social Connection and Isolation Panel [NHANES]      Frequency of Communication with Friends and Family: More than three times a week      Frequency of Social Gatherings with Friends and Family: Three times a week      Attends Hindu Services: Patient declined      Active Member of Clubs or Organizations: Patient declined      Attends Club or Organization Meetings: Not on file      Marital Status:    Interpersonal Safety: Not on file   Housing Stability: Low Risk  (2/22/2023)    Housing Stability Vital Sign      Unable to Pay for Housing in the Last Year: No      Number of Places Lived in the Last Year: 1      Unstable Housing in the Last Year: No       Outpatient Encounter Medications as of 1/22/2024   Medication Sig Dispense Refill     albuterol (PROAIR HFA/PROVENTIL HFA/VENTOLIN HFA) 108 (90 Base) MCG/ACT inhaler Inhale 2 puffs into the lungs every 4 hours as needed for shortness of breath or wheezing 36 g 4     cephALEXin (KEFLEX) 500 MG capsule Take 1 capsule (500 mg) by mouth 2 times daily for 7 days (Patient not taking: Reported on 1/22/2024) 14 capsule 0     fluticasone-salmeterol (ADVAIR) 250-50 MCG/ACT inhaler Inhale 1 puff into the lungs 2 times daily 60 each 11     montelukast (SINGULAIR) 10 MG tablet Take 1 tablet (10 mg) by mouth At Bedtime 90 tablet 3     traZODone (DESYREL) 50 MG tablet Take 1-2 tablets ( mg) by mouth At Bedtime 60 tablet 3     No facility-administered encounter medications on file as of 1/22/2024.              Review Of Systems  Skin: As above  Eyes: negative  Ears/Nose/Throat: negative  Respiratory: No shortness of breath, dyspnea on exertion, cough, or hemoptysis  Cardiovascular: negative  Gastrointestinal: negative  Genitourinary: negative  Musculoskeletal: negative  Neurologic: negative  Psychiatric: negative  Hematologic/Lymphatic/Immunologic: negative  Endocrine: negative      O:   NAD, WDWN, Alert & Oriented, Mood & Affect wnl, Vitals stable   General appearance yung ii   Vitals stable   Alert, oriented and in no acute distress   Pupura on trunk and ext       Eyes: Conjunctivae/lids:Normal     ENT: Lips, buccal mucosa, tongue: normal    MSK:Normal    Cardiovascular: peripheral edema slight     Pulm: Breathing Normal    Neuro/Psych: Orientation:Normal; Mood/Affect:Normal      A/P:  Vasculitis   Pathophysiology discussed with pateint and information provided   He denies any new medications or recent illness  Ab  TANGENTIAL BIOPSY SENT OUT:  After consent, anesthesia with LEC and prep, tangential excision performed and specimen sent out for permanent section histology.  No complications and routine wound care. Patient told to call our office in 1-2 weeks for result.       H+E and DIF  Check lor, dsdna, strep, anca, ua today   It was a pleasure speaking to Mario Delgado today.  Previous clinic  notes and pertinent laboratory tests were reviewed prior to Mario Delgado's visit.  Return to clinic 2 weeks      Again, thank you for allowing me to participate in the care of your patient.        Sincerely,        Mario Hill MD

## 2024-01-22 NOTE — NURSING NOTE
Mario Delgado's chief complaint for this visit includes:  Chief Complaint   Patient presents with    Derm Problem     Patient started off with a rash on lower legs and now is spreading throughout his body. Patient did have a culture completed and was told to stop antibiotic. Patient is increase itching and hot to touch at times.      PCP: Mira Arboleda    Referring Provider:  No referring provider defined for this encounter.    There were no vitals taken for this visit.  No Pain (0)        Allergies   Allergen Reactions    No Known Drug Allergy     Pollen Extract          Do you need any medication refills at today's visit? No    Jen Pulliam MA

## 2024-01-23 LAB
ANA SER QL IF: NEGATIVE
DSDNA AB SER-ACNC: 1.7 IU/ML
ENA SM IGG SER IA-ACNC: <0.7 U/ML
ENA SM IGG SER IA-ACNC: NEGATIVE
ENA SS-A AB SER IA-ACNC: 0.6 U/ML
ENA SS-A AB SER IA-ACNC: NEGATIVE
ENA SS-B IGG SER IA-ACNC: <0.6 U/ML
ENA SS-B IGG SER IA-ACNC: NEGATIVE
MYELOPEROXIDASE AB SER IA-ACNC: 0.3 U/ML
MYELOPEROXIDASE AB SER IA-ACNC: NEGATIVE
PROTEINASE3 AB SER IA-ACNC: <1 U/ML
PROTEINASE3 AB SER IA-ACNC: NEGATIVE
U1 SNRNP IGG SER IA-ACNC: 1.2 U/ML
U1 SNRNP IGG SER IA-ACNC: NEGATIVE

## 2024-01-24 LAB — ASO AB SERPL-ACNC: 77 IU/ML

## 2024-01-25 VITALS
DIASTOLIC BLOOD PRESSURE: 110 MMHG | WEIGHT: 306 LBS | RESPIRATION RATE: 16 BRPM | HEIGHT: 69 IN | TEMPERATURE: 97.8 F | BODY MASS INDEX: 45.32 KG/M2 | SYSTOLIC BLOOD PRESSURE: 116 MMHG | HEART RATE: 99 BPM | OXYGEN SATURATION: 96 %

## 2024-01-25 ASSESSMENT — PAIN SCALES - GENERAL: PAINLEVEL: NO PAIN (0)

## 2024-01-29 LAB
PATH REPORT.COMMENTS IMP SPEC: NORMAL
PATH REPORT.FINAL DX SPEC: NORMAL
PATH REPORT.GROSS SPEC: NORMAL
PATH REPORT.MICROSCOPIC SPEC OTHER STN: NORMAL
PATH REPORT.RELEVANT HX SPEC: NORMAL

## 2024-01-30 ENCOUNTER — TELEPHONE (OUTPATIENT)
Dept: DERMATOLOGY | Facility: CLINIC | Age: 44
End: 2024-01-30
Payer: COMMERCIAL

## 2024-01-30 DIAGNOSIS — I77.6 VASCULITIS (H): Primary | ICD-10-CM

## 2024-01-30 NOTE — TELEPHONE ENCOUNTER
Patient Contact    Attempt # 1    Was call answered?  No    Called patient. No answer. Left message to call back. Clinic number was provided.     Harriett Hinson RN    [facility]   [phone]

## 2024-01-30 NOTE — TELEPHONE ENCOUNTER
----- Message from Mario Hill MD sent at 1/30/2024  7:15 AM CST -----  Your biopsy was consistent with vasculitis.     To date most of the lbood work has been negative.     You did have some blood in your urine but your kidney function was fine.      I would recheck your urine in a week (please order UA).     How is the rash?  How are you feeling?      If things are not improving we could try a course of prednisone.  Please let me know.

## 2024-01-31 RX ORDER — FLUOCINONIDE 0.5 MG/G
CREAM TOPICAL 2 TIMES DAILY
Qty: 60 G | Refills: 1 | Status: SHIPPED | OUTPATIENT
Start: 2024-01-31 | End: 2024-09-03

## 2024-01-31 RX ORDER — PREDNISONE 10 MG/1
TABLET ORAL
Qty: 80 TABLET | Refills: 1 | Status: SHIPPED | OUTPATIENT
Start: 2024-01-31 | End: 2024-07-26

## 2024-01-31 NOTE — TELEPHONE ENCOUNTER
"Patient notified. Patient verbalized understanding.     Patient reports he is worse:     \"The rash has spread to my stomach and arms and now the joints in my wrists and knuckles hurt and the knuckle on my hand is swollen and my elbows and legs hurt and my hand is swollen and tender.  It seems to be moving to different areas, my stomach, my chest, upper arms... My legs are scabbed over because I have been scratching\"    Pharmacy cued.     Patient is asking if there a steroid cream as well as Prednisone he can use?    OK to leave a detailed voice message if we cannot reach patient      Harriett Hinson RN    "

## 2024-02-06 ENCOUNTER — TELEPHONE (OUTPATIENT)
Dept: DERMATOLOGY | Facility: CLINIC | Age: 44
End: 2024-02-06

## 2024-02-06 ENCOUNTER — LAB (OUTPATIENT)
Dept: LAB | Facility: CLINIC | Age: 44
End: 2024-02-06
Attending: DERMATOLOGY
Payer: COMMERCIAL

## 2024-02-06 DIAGNOSIS — R31.9 HEMATURIA: Primary | ICD-10-CM

## 2024-02-06 DIAGNOSIS — I77.6 VASCULITIS (H): ICD-10-CM

## 2024-02-06 LAB
ALBUMIN UR-MCNC: 100 MG/DL
APPEARANCE UR: CLEAR
BACTERIA #/AREA URNS HPF: ABNORMAL /HPF
BILIRUB UR QL STRIP: NEGATIVE
COLOR UR AUTO: YELLOW
GLUCOSE UR STRIP-MCNC: NEGATIVE MG/DL
HGB UR QL STRIP: ABNORMAL
KETONES UR STRIP-MCNC: NEGATIVE MG/DL
LEUKOCYTE ESTERASE UR QL STRIP: NEGATIVE
NITRATE UR QL: NEGATIVE
PH UR STRIP: 6 [PH] (ref 5–7)
RBC #/AREA URNS AUTO: ABNORMAL /HPF
SP GR UR STRIP: 1.02 (ref 1–1.03)
UROBILINOGEN UR STRIP-ACNC: 0.2 E.U./DL
WBC #/AREA URNS AUTO: ABNORMAL /HPF

## 2024-02-06 PROCEDURE — 81001 URINALYSIS AUTO W/SCOPE: CPT

## 2024-02-06 NOTE — TELEPHONE ENCOUNTER
----- Message from Mario Hill MD sent at 2/6/2024 11:42 AM CST -----  Blood still present in urine    Please place a referral to Urology to make sure kidney function fine

## 2024-03-07 ENCOUNTER — MYC REFILL (OUTPATIENT)
Dept: FAMILY MEDICINE | Facility: CLINIC | Age: 44
End: 2024-03-07
Payer: COMMERCIAL

## 2024-03-07 DIAGNOSIS — J45.40 MODERATE PERSISTENT ASTHMA WITHOUT COMPLICATION: ICD-10-CM

## 2024-03-07 RX ORDER — ALBUTEROL SULFATE 90 UG/1
2 AEROSOL, METERED RESPIRATORY (INHALATION) EVERY 4 HOURS PRN
Qty: 8.5 G | Refills: 5 | Status: SHIPPED | OUTPATIENT
Start: 2024-03-07 | End: 2024-08-28

## 2024-03-07 RX ORDER — ALBUTEROL SULFATE 90 UG/1
2 AEROSOL, METERED RESPIRATORY (INHALATION) EVERY 4 HOURS PRN
Qty: 36 G | Refills: 4 | Status: SHIPPED | OUTPATIENT
Start: 2024-03-07 | End: 2024-07-11

## 2024-05-26 ENCOUNTER — HEALTH MAINTENANCE LETTER (OUTPATIENT)
Age: 44
End: 2024-05-26

## 2024-07-10 NOTE — PROGRESS NOTES
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  He denies any history or current fevers, weight loss, abdominal pain, sinusitis, hearing loss, external ear or nose cartilage inflammation.  He has been noted to have hematuria and proteinuria on UA and this has not been worked up further.  This, along with the presence of likely inflammatory arthritis on presentation and recurrent cutaneous vasculitis with necrosis raise concern for systemic involvement.  Will obtain CT abdomen to look for mesenteric vessel involvement which can support the diagnosis of PAN.  Workup for ANCA vasculitis has been negative with negative MPO and HI-3.  Will check rheumatoid factor and cryoglobulins today as well as complements.  No signs or symptoms to suggest a vasculitis associated with an underlying systemic autoimmune disease such as lupus or Sjogren's and with negative SUZIE, double-stranded DNA, Norman, SSA, SSB, and RNP.  Vasculitis associated with underlying infection seems less likely given that he is overall appears well and has had a negative HIV and hep C serologies in 2023.  Will recheck Hep C today and check hep B serologies.  No red flag symptoms for underlying malignancy.  Will check SPEP and UPEP today.    Plan:   -Repeat urine studies  -Nephrology referral placed.  Request assessment for possible renal biopsy to help assess if there is systemic involvement of vasculitis.  -CTA abdomen and pelvis  -Blood work as below  -Ophthalmology referral for complete eye exam to assess for vasculitic involvement  -Once infectious labs return negative will start methotrexate      High risk medication  use  Comment: Methotrexate   hepatitis C negative 2/2023  Plan:  -Quant gold and hepatitis B  -Discussed common side effects of methotrexate including oral ulcers, and GI upset.  Discussed need for periodic lab operatory monitoring for kidney, liver, and blood counts.  Discussed small risk of inflammatory lung condition associated methotrexate use and possible increase in lymphomas.  -requested pt obtain yearly skin checks for skin cancer   -Toxicity monitoring labs 2 weeks after initiation and dosages and otherwise every 3 months  -MTM referral        Orders Placed This Encounter   Procedures    CTA Abdomen Pelvis with Contrast    Protein  random urine    UA with Microscopic reflex to Culture    ALT    AST    Creatinine    CBC with platelets    Complement C3    Complement C4    Cryoglobulin identification    Rheumatoid factor    Hepatitis B core antibody    Hepatitis B surface antigen    [facility]; VASC; Antineutrophil Cytoplasmic Antibodies Vasculitis Panel, Serum (Laboratory Miscellaneous Order)    Hemoglobin A1c    Protein electrophoresis random urine    UA Microscopic with Reflex to Culture    Adult Eye  Referral    Adult Nephrology  Referral        The longitudinal plan of care for the diagnosis(es)/condition(s) as documented were addressed during this visit. Due to the added complexity in care, I will continue to support Jose in the subsequent management and with ongoing continuity of care.    RTC in 4 weeks     80 minutes spent on the day of the encounter doing chart review, history and exam, counseling and documentation.     Subjective       HPI    Referred by Dr. 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    In [place] developed a rash over his legs over the course of a week which was very itchy. Later developed joint pain and swelling over his knees, elbows, fingers. Couldn't make a fist  "due to swelling in his knuckles. Felt the pain migrated around his joints. No fevers, no abdominal pain, no diarrhea or blood in stool. Initially was put on some antibiotic which he reports made the rash worse.  Was started on prednisone 40mg in early February with taper- down by 5mg every 4 days. Reports this helped his joint pain which resolved after a few weeks and the rash slowly improved over time but continued to have \"flares\" of the rash that would start on his legs and spread up his arms and then self resolved after 3 to 4 days.  He continues to have scabs over his legs that he feels are not healing well.  He will intermittently take prednisone for the rash but does not like to take this due to the weight gain. Last took prednisone 2-3 weeks ago, 10mg as noticed his skin flaring. Feels it has flared 5-6 times since January.  Denies any new medications around the time of his initial symptom onset.  We reviewed a list of common medications that cause vasculitis today and he confirms and these were started.      Has a history of nephrolithiasis about a decade ago.   History of shingles around his waist 15 years ago     No raynaud's, no cartilage swelling, no recurrent sinus infections, no hearing loss,   no oral or nasal ulcers, no hx of inflammatory eye disease, no other rashes,  +crusty eyes in the morning and increased tearing, no dry eyes or dry mouth,  no dyspnea, no cough, no chest pain, no fevers or weigh loss, no hx of blood clots,  no numbness or tingling, no muscle pain.    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   Not a previous smoker. Not a current smoker.       Lab and Imaging review:    I reviewed recent labs and imaging includin/2024 proteinuria and hematuria   Normal CMP  Neg/normal SSA, SSB, Norman, RNP, streptolysin O, MPO, PR3, SUZIE, dsDNA,   Hep C non-reactive, HIV neg      2024 " Dermatopathology     Final Diagnosis   A(1). Abdomen:  - Consistent with evolving leukocytoclastic vasculitis - (see comment and description)    - Nonspecific but supportive immunofluorescence study, without evidence of IgA vasculitis - (see description)   Electronically signed by Kurt Talavera MD on 1/29/2024 at  4:06 PM   Comment  [id number]   While definitive vascular necrosis is not present, the presence of innumerable perivascular neutrophils accompanied by eosinophils and interstitial erythrocyte extravasation is consistent with the clinical impression of cutaneous small vessel vasculitis.  The deep vascular plexus is not available for review, though visualized penetrative vessels appear uninvolved.  Ongoing clinical correlation is recommended.         Current Outpatient Medications:     albuterol (PROAIR HFA/PROVENTIL HFA/VENTOLIN HFA) 108 (90 Base) MCG/ACT inhaler, INHALE 2 PUFFS INTO THE LUNGS EVERY 4 HOURS AS NEEDED FOR SHORTNESS OF BREATH OR WHEEZING, Disp: 8.5 g, Rfl: 5    fluticasone-salmeterol (ADVAIR) 250-50 MCG/ACT inhaler, Inhale 1 puff into the lungs 2 times daily, Disp: 60 each, Rfl: 11    montelukast (SINGULAIR) 10 MG tablet, Take 1 tablet (10 mg) by mouth At Bedtime, Disp: 90 tablet, Rfl: 3    traZODone (DESYREL) 50 MG tablet, Take 1-2 tablets ( mg) by mouth At Bedtime, Disp: 60 tablet, Rfl: 3    fluocinonide (LIDEX) 0.05 % external cream, Apply topically 2 times daily To affected areas of skin until cleared (Patient not taking: Reported on 7/11/2024), Disp: 60 g, Rfl: 1    predniSONE (DELTASONE) 10 MG tablet, Take 4 tablets daily by mouth for 4 days, decrease by 1/2 tablet every 4 days. (Patient not taking: Reported on 7/11/2024), Disp: 80 tablet, Rfl: 1  Allergies:  Allergies   Allergen Reactions    No Known Drug Allergy     Pollen Extract      Medical Hx:  Past Medical History:   Diagnosis Date    Mild intermittent asthma      Surgical Hx:  Past Surgical History:   Procedure  Laterality Date    ENT SURGERY      Nasal Fracture    NO HISTORY OF SURGERY       Family Hx:  Family History   Problem Relation Age of Onset    Sleep Apnea Father     Asthma Mother     Family History Negative No family hx of      Social Hx:  Social History     Tobacco Use    Smoking status: Never    Smokeless tobacco: Former     Types: Chew     Quit date: 2016   Vaping Use    Vaping status: Never Used   Substance Use Topics    Alcohol use: Yes     Comment: 3-4 drinks 1-2x/month    Drug use: No        Objective   Physical Exam   BP (!) 118/100 (BP Location: Right arm, Patient Position: Sitting, Cuff Size: Adult Large)   Pulse 98   Wt 143.3 kg (315 lb 14.4 oz)   SpO2 93%   BMI 46.65 kg/m    General: alert, well appearing, no distress, obese   HEENT: no alopecia, erythematous papules over erythematous base over posterior and lateral scalp a few appear pustular, clear conjunctiva,   Cardiac: normal rate and rhythm, no murmur, rubs or gallops   Pulm: normal respiratory effort, clear to auscultation bilaterally   MSK: Hand, wrist, elbow, and shoulder joints without evidence of synovitis or effusion. Intact and nonpainful range of motion.   Skin:  A few scattered erythematous macules of her right arm.  Otherwise no significant lesions over back, chest, stomach, or bilateral arms.  A few small erythematous macules and papules as pictured below with hyperpigmented and indurated skin over bilateral lower extremities with edema, and a few healing ulcerations.    Left leg             Right leg           Right leg     Note pitting edema and healing ulcer     Mariah Patrick MD  Rheumatology

## 2024-07-11 ENCOUNTER — OFFICE VISIT (OUTPATIENT)
Dept: RHEUMATOLOGY | Facility: CLINIC | Age: 44
End: 2024-07-11
Payer: COMMERCIAL

## 2024-07-11 ENCOUNTER — LAB (OUTPATIENT)
Dept: LAB | Facility: CLINIC | Age: 44
End: 2024-07-11
Payer: COMMERCIAL

## 2024-07-11 VITALS
BODY MASS INDEX: 46.65 KG/M2 | SYSTOLIC BLOOD PRESSURE: 118 MMHG | WEIGHT: 315 LBS | DIASTOLIC BLOOD PRESSURE: 100 MMHG | HEART RATE: 98 BPM | OXYGEN SATURATION: 93 %

## 2024-07-11 DIAGNOSIS — I77.6 VASCULITIS (H): ICD-10-CM

## 2024-07-11 DIAGNOSIS — M31.0 LEUKOCYTOCLASTIC VASCULITIS (H): ICD-10-CM

## 2024-07-11 LAB
ALBUMIN MFR UR ELPH: 122 MG/DL
ALBUMIN UR-MCNC: 100 MG/DL
ALT SERPL W P-5'-P-CCNC: 46 U/L (ref 0–70)
APPEARANCE UR: CLEAR
AST SERPL W P-5'-P-CCNC: 29 U/L (ref 0–45)
BACTERIA #/AREA URNS HPF: ABNORMAL /HPF
BILIRUB UR QL STRIP: ABNORMAL
COLOR UR AUTO: YELLOW
CREAT SERPL-MCNC: 0.94 MG/DL (ref 0.67–1.17)
CREAT UR-MCNC: 272 MG/DL
CRP SERPL-MCNC: 16.9 MG/L
EGFRCR SERPLBLD CKD-EPI 2021: >90 ML/MIN/1.73M2
ERYTHROCYTE [DISTWIDTH] IN BLOOD BY AUTOMATED COUNT: 13.1 % (ref 10–15)
GLUCOSE UR STRIP-MCNC: NEGATIVE MG/DL
HBA1C MFR BLD: 6.2 % (ref 0–5.6)
HBV CORE AB SERPL QL IA: NONREACTIVE
HBV SURFACE AG SERPL QL IA: NONREACTIVE
HCT VFR BLD AUTO: 45.5 % (ref 40–53)
HGB BLD-MCNC: 15.4 G/DL (ref 13.3–17.7)
HGB UR QL STRIP: ABNORMAL
KETONES UR STRIP-MCNC: NEGATIVE MG/DL
LEUKOCYTE ESTERASE UR QL STRIP: NEGATIVE
Lab: NORMAL
MCH RBC QN AUTO: 30 PG (ref 26.5–33)
MCHC RBC AUTO-ENTMCNC: 33.8 G/DL (ref 31.5–36.5)
MCV RBC AUTO: 89 FL (ref 78–100)
MUCOUS THREADS #/AREA URNS LPF: PRESENT /LPF
NITRATE UR QL: NEGATIVE
PERFORMING LABORATORY: NORMAL
PH UR STRIP: 6 [PH] (ref 5–8)
PLATELET # BLD AUTO: 302 10E3/UL (ref 150–450)
PROT/CREAT 24H UR: 0.45 MG/MG CR (ref 0–0.2)
RBC # BLD AUTO: 5.13 10E6/UL (ref 4.4–5.9)
RBC #/AREA URNS AUTO: ABNORMAL /HPF
RHEUMATOID FACT SERPL-ACNC: <10 IU/ML
SP GR UR STRIP: >=1.03 (ref 1–1.03)
SPECIMEN STATUS: NORMAL
SQUAMOUS #/AREA URNS AUTO: ABNORMAL /LPF
TEST NAME: NORMAL
TOTAL PROTEIN SERUM FOR ELP: 6.9 G/DL (ref 6.4–8.3)
UROBILINOGEN UR STRIP-ACNC: 1 E.U./DL
WBC # BLD AUTO: 8.1 10E3/UL (ref 4–11)
WBC #/AREA URNS AUTO: ABNORMAL /HPF

## 2024-07-11 PROCEDURE — 84156 ASSAY OF PROTEIN URINE: CPT | Performed by: STUDENT IN AN ORGANIZED HEALTH CARE EDUCATION/TRAINING PROGRAM

## 2024-07-11 PROCEDURE — 86140 C-REACTIVE PROTEIN: CPT | Performed by: STUDENT IN AN ORGANIZED HEALTH CARE EDUCATION/TRAINING PROGRAM

## 2024-07-11 PROCEDURE — 36415 COLL VENOUS BLD VENIPUNCTURE: CPT | Performed by: STUDENT IN AN ORGANIZED HEALTH CARE EDUCATION/TRAINING PROGRAM

## 2024-07-11 PROCEDURE — 85027 COMPLETE CBC AUTOMATED: CPT | Performed by: STUDENT IN AN ORGANIZED HEALTH CARE EDUCATION/TRAINING PROGRAM

## 2024-07-11 PROCEDURE — 84450 TRANSFERASE (AST) (SGOT): CPT | Performed by: STUDENT IN AN ORGANIZED HEALTH CARE EDUCATION/TRAINING PROGRAM

## 2024-07-11 PROCEDURE — 84155 ASSAY OF PROTEIN SERUM: CPT

## 2024-07-11 PROCEDURE — 99417 PROLNG OP E/M EACH 15 MIN: CPT | Performed by: STUDENT IN AN ORGANIZED HEALTH CARE EDUCATION/TRAINING PROGRAM

## 2024-07-11 PROCEDURE — 99205 OFFICE O/P NEW HI 60 MIN: CPT | Performed by: STUDENT IN AN ORGANIZED HEALTH CARE EDUCATION/TRAINING PROGRAM

## 2024-07-11 PROCEDURE — 84165 PROTEIN E-PHORESIS SERUM: CPT | Performed by: PATHOLOGY

## 2024-07-11 PROCEDURE — 82595 ASSAY OF CRYOGLOBULIN: CPT

## 2024-07-11 PROCEDURE — 81001 URINALYSIS AUTO W/SCOPE: CPT | Performed by: STUDENT IN AN ORGANIZED HEALTH CARE EDUCATION/TRAINING PROGRAM

## 2024-07-11 PROCEDURE — 83036 HEMOGLOBIN GLYCOSYLATED A1C: CPT | Performed by: STUDENT IN AN ORGANIZED HEALTH CARE EDUCATION/TRAINING PROGRAM

## 2024-07-11 PROCEDURE — 84166 PROTEIN E-PHORESIS/URINE/CSF: CPT | Performed by: PATHOLOGY

## 2024-07-11 PROCEDURE — 83516 IMMUNOASSAY NONANTIBODY: CPT | Performed by: STUDENT IN AN ORGANIZED HEALTH CARE EDUCATION/TRAINING PROGRAM

## 2024-07-11 PROCEDURE — 86431 RHEUMATOID FACTOR QUANT: CPT | Performed by: STUDENT IN AN ORGANIZED HEALTH CARE EDUCATION/TRAINING PROGRAM

## 2024-07-11 PROCEDURE — 84460 ALANINE AMINO (ALT) (SGPT): CPT | Performed by: STUDENT IN AN ORGANIZED HEALTH CARE EDUCATION/TRAINING PROGRAM

## 2024-07-11 PROCEDURE — 86803 HEPATITIS C AB TEST: CPT | Performed by: STUDENT IN AN ORGANIZED HEALTH CARE EDUCATION/TRAINING PROGRAM

## 2024-07-11 PROCEDURE — 86160 COMPLEMENT ANTIGEN: CPT | Performed by: STUDENT IN AN ORGANIZED HEALTH CARE EDUCATION/TRAINING PROGRAM

## 2024-07-11 PROCEDURE — 80069 RENAL FUNCTION PANEL: CPT | Performed by: STUDENT IN AN ORGANIZED HEALTH CARE EDUCATION/TRAINING PROGRAM

## 2024-07-11 PROCEDURE — 87340 HEPATITIS B SURFACE AG IA: CPT | Performed by: STUDENT IN AN ORGANIZED HEALTH CARE EDUCATION/TRAINING PROGRAM

## 2024-07-11 PROCEDURE — G2211 COMPLEX E/M VISIT ADD ON: HCPCS | Performed by: STUDENT IN AN ORGANIZED HEALTH CARE EDUCATION/TRAINING PROGRAM

## 2024-07-11 PROCEDURE — 86481 TB AG RESPONSE T-CELL SUSP: CPT | Performed by: STUDENT IN AN ORGANIZED HEALTH CARE EDUCATION/TRAINING PROGRAM

## 2024-07-11 PROCEDURE — 86704 HEP B CORE ANTIBODY TOTAL: CPT | Performed by: STUDENT IN AN ORGANIZED HEALTH CARE EDUCATION/TRAINING PROGRAM

## 2024-07-11 NOTE — PATIENT INSTRUCTIONS
I recommend that you obtain the COVID vaccine, Shingles (two dose series) and pneumonia vaccine (PCV 20).

## 2024-07-12 LAB
C3 SERPL-MCNC: 166 MG/DL (ref 81–157)
C4 SERPL-MCNC: 25 MG/DL (ref 13–39)
GAMMA INTERFERON BACKGROUND BLD IA-ACNC: 0.03 IU/ML
HCV AB SERPL QL IA: NONREACTIVE
M TB IFN-G BLD-IMP: NEGATIVE
M TB IFN-G CD4+ BCKGRND COR BLD-ACNC: 9.97 IU/ML
MITOGEN IGNF BCKGRD COR BLD-ACNC: 0.01 IU/ML
MITOGEN IGNF BCKGRD COR BLD-ACNC: 0.03 IU/ML
QUANTIFERON MITOGEN: 10 IU/ML
QUANTIFERON NIL TUBE: 0.03 IU/ML
QUANTIFERON TB1 TUBE: 0.04 IU/ML
QUANTIFERON TB2 TUBE: 0.06

## 2024-07-13 LAB — MAYO MISC RESULT: NORMAL

## 2024-07-15 ENCOUNTER — TELEPHONE (OUTPATIENT)
Dept: RHEUMATOLOGY | Facility: CLINIC | Age: 44
End: 2024-07-15
Payer: COMMERCIAL

## 2024-07-15 LAB
ALBUMIN MFR UR ELPH: 79.4 %
ALBUMIN SERPL ELPH-MCNC: 4 G/DL (ref 3.7–5.1)
ALPHA1 GLOB MFR UR ELPH: 3.6 %
ALPHA1 GLOB SERPL ELPH-MCNC: 0.3 G/DL (ref 0.2–0.4)
ALPHA2 GLOB MFR UR ELPH: 4.2 %
ALPHA2 GLOB SERPL ELPH-MCNC: 0.7 G/DL (ref 0.5–0.9)
B-GLOBULIN MFR UR ELPH: 7.2 %
B-GLOBULIN SERPL ELPH-MCNC: 0.9 G/DL (ref 0.6–1)
GAMMA GLOB MFR UR ELPH: 5.6 %
GAMMA GLOB SERPL ELPH-MCNC: 1 G/DL (ref 0.7–1.6)
M PROTEIN MFR UR ELPH: 0 %
M PROTEIN SERPL ELPH-MCNC: 0 G/DL
PROT PATTERN SERPL ELPH-IMP: NORMAL
PROT PATTERN UR ELPH-IMP: ABNORMAL

## 2024-07-15 NOTE — TELEPHONE ENCOUNTER
MTM referral from: [facility] visit (referral by provider)    MTM referral outreach attempt #2 on July 15, 2024 at 11:21 AM      Outcome: Patient not reachable after several attempts, routed to Pharmacist Team/Provider as an FYI    Use hbc for the carrier/Plan on the flowsheet      [employer] Message Sent    Jen Arambula CPhT  MTM      Patient called back and scheduled visit.

## 2024-07-16 ENCOUNTER — TELEPHONE (OUTPATIENT)
Dept: NEPHROLOGY | Facility: CLINIC | Age: 44
End: 2024-07-16
Payer: COMMERCIAL

## 2024-07-16 DIAGNOSIS — M31.0 LEUKOCYTOCLASTIC VASCULITIS (H): Primary | ICD-10-CM

## 2024-07-16 LAB — CRYOGLOB SER QL: NEGATIVE

## 2024-07-16 NOTE — CONFIDENTIAL NOTE
DIAGNOSIS: Vasculitis (H24)    DATE RECEIVED:  07.17.2024   NOTES STATUS DETAILS   OFFICE NOTE from referring provider Internal 07.11.2024 Mariah Patrick MD    OFFICE NOTE from other specialist  Internal 01.22.2024 Al Alston MD    *DERMATOLOGY Internal 01.2202.24 Mario Hill MD    MEDICATION LIST Internal    LABS     CBC Internal 07.11.2024   CMP Internal 07.11.2024   UA Internal 07.11.2024   URINE PROTEIN Internal 07.11.2024

## 2024-07-16 NOTE — TELEPHONE ENCOUNTER
Referral call initiated and completed/scheduled for tomorrow.  Added Renal panel to already collected sample per standing Vasculitis Protocol.  Confirmed date and time of appt, address and directions given and patient confirmed he is available.  Vasculitis Program nurse line shared for any additional questions or concerns.  Updated referring provider of scheduled appt and referral completed.  Yoly Gruber RN, BSN, PHN  Vasculitis & Lupus Program Nephrology Nurse Navigator  [phone]

## 2024-07-17 ENCOUNTER — OFFICE VISIT (OUTPATIENT)
Dept: NEPHROLOGY | Facility: CLINIC | Age: 44
End: 2024-07-17
Attending: INTERNAL MEDICINE
Payer: COMMERCIAL

## 2024-07-17 ENCOUNTER — PRE VISIT (OUTPATIENT)
Dept: NEPHROLOGY | Facility: CLINIC | Age: 44
End: 2024-07-17
Payer: COMMERCIAL

## 2024-07-17 VITALS
HEIGHT: 69 IN | OXYGEN SATURATION: 96 % | BODY MASS INDEX: 46.65 KG/M2 | DIASTOLIC BLOOD PRESSURE: 84 MMHG | SYSTOLIC BLOOD PRESSURE: 131 MMHG | WEIGHT: 315 LBS | HEART RATE: 82 BPM | RESPIRATION RATE: 24 BRPM

## 2024-07-17 DIAGNOSIS — I77.6 VASCULITIS (H): ICD-10-CM

## 2024-07-17 LAB
ALBUMIN SERPL BCG-MCNC: 4.2 G/DL (ref 3.5–5.2)
ANION GAP SERPL CALCULATED.3IONS-SCNC: 14 MMOL/L (ref 7–15)
BUN SERPL-MCNC: 10.1 MG/DL (ref 6–20)
CALCIUM SERPL-MCNC: 8.9 MG/DL (ref 8.8–10.4)
CHLORIDE SERPL-SCNC: 102 MMOL/L (ref 98–107)
CREAT SERPL-MCNC: 0.94 MG/DL (ref 0.67–1.17)
EGFRCR SERPLBLD CKD-EPI 2021: >90 ML/MIN/1.73M2
GLUCOSE SERPL-MCNC: 113 MG/DL (ref 70–99)
HCO3 SERPL-SCNC: 23 MMOL/L (ref 22–29)
PHOSPHATE SERPL-MCNC: 2.9 MG/DL (ref 2.5–4.5)
POTASSIUM SERPL-SCNC: 4.3 MMOL/L (ref 3.4–5.3)
SODIUM SERPL-SCNC: 139 MMOL/L (ref 135–145)

## 2024-07-17 PROCEDURE — 99417 PROLNG OP E/M EACH 15 MIN: CPT | Performed by: INTERNAL MEDICINE

## 2024-07-17 PROCEDURE — 99214 OFFICE O/P EST MOD 30 MIN: CPT | Performed by: INTERNAL MEDICINE

## 2024-07-17 PROCEDURE — 99205 OFFICE O/P NEW HI 60 MIN: CPT | Performed by: INTERNAL MEDICINE

## 2024-07-17 RX ORDER — LISINOPRIL 20 MG/1
20 TABLET ORAL DAILY
Qty: 90 TABLET | Refills: 3 | Status: SHIPPED | OUTPATIENT
Start: 2024-07-17

## 2024-07-17 RX ORDER — FUROSEMIDE 20 MG
20 TABLET ORAL 2 TIMES DAILY
Qty: 180 TABLET | Refills: 3 | Status: SHIPPED | OUTPATIENT
Start: 2024-07-17

## 2024-07-17 RX ORDER — IBUPROFEN 200 MG
800 TABLET ORAL EVERY 8 HOURS PRN
COMMUNITY
End: 2024-07-17

## 2024-07-17 ASSESSMENT — PAIN SCALES - GENERAL: PAINLEVEL: SEVERE PAIN (6)

## 2024-07-17 NOTE — LETTER
"7/17/2024       RE: Mario Delgado  [address] [facility] [address]     Dear Colleague,    Thank you for referring your patient, Mario Delgado, to the [facility]PHROLOG[facility] [place] at [facility]. Please see a copy of my visit note below.    Nephrology Clinic    Mario Delgado MRN:[id number] YOB: 1980  Date of Service: 07/17/2024  Primary care provider: Mira Arboleda  Requesting physician:   Mariah Patrick MD         REASON FOR CONSULT: hematuria and proteinuria, history of leucocytoclastic vasculitis and inflammatory arthritis.    HISTORY OF PRESENT ILLNESS:   Mario Delgado is a 44 year old male who presents for evaluation of hematuria and proteinuria.  He developed  purpura over legs, arms, and trunk in January 2024 with biopsy showing leukocytoclastic vasculitis.  The rash would \"come and go\".  Initially treated with antibiotics -> got worse.  Seen by Dermatology -> treated with prednisone (~ Feb), biopsied.  Took ~ 5 months to see Rheumatology.  The rash improved with prednisone, but he experienced joint pain when he stopped the prednisone.   He describes classic migratory polyarthralgias, with described redness and heat.  Currently takes prednisone \"prn\" for joint pain.   Last course of prednisone was in April 2024.  Dr Patrick is considering treatment with Methotrexate.        He has been noted to have hematuria and proteinuria on UA and this has not been worked up.    He underwent an extensive serologic work up which was negative for MPO- and KY-3-ANCA,  SUZIE, double-stranded DNA, Norman, SSA, SSB, and RNP.      Stopped EtOH for a month, reports drinking occasionally only    Today (7/17/24).  C/o L Ext pain related to swelling.  Legs hurt after sitting down more than ~ 20 min.   Energy is \"down\".  Gained 40 lbs over last 6 months.  Current weight is \"highest ever\".  Some ZAPATA and SOB " (asthma worse with heat, using albuterol inhaler more often).  Some tightness in upper chest bilaterally, Lasted 40 minutes continuously.  Not related to exertion.  No GI symptoms.  No  symptoms or gross hematuria.  No numbness or tingling other than transiently when sitting.         PAST MEDICAL HISTORY:  Past Medical History:   Diagnosis Date     Mild intermittent asthma      Nephrolithiasis    Stones 10-11 yrs ago.  1 definite episode.    S/p shingles 15 yrs ago.   No history of GI bleeding or liver disease.    PAST SURGICAL HISTORY:  Past Surgical History:   Procedure Laterality Date     ENT SURGERY      Nasal Fracture  at age 13     NO HISTORY OF SURGERY     S/p meniscal repair R knee 2023.    MEDICATIONS:  Prescription Medications as of 7/17/2024         Rx Number Disp Refills Start End Last Dispensed Date Next Fill Date Owning Pharmacy    albuterol (PROAIR HFA/PROVENTIL HFA/VENTOLIN HFA) 108 (90 Base) MCG/ACT inhaler  8.5 g 5 3/7/2024 --   [address]    Sig: INHALE 2 PUFFS INTO THE LUNGS EVERY 4 HOURS AS NEEDED FOR SHORTNESS OF BREATH OR WHEEZING    Class: E-Prescribe    Notes to Pharmacy: Pharmacy may dispense brand covered by insurance (Proair, or proventil or ventolin or generic albuterol inhaler)    Route: Inhalation    fluocinonide (LIDEX) 0.05 % external cream  60 g 1 1/31/2024 --   [address]    Sig: Apply topically 2 times daily To affected areas of skin until cleared    Class: E-Prescribe    Route: Topical    fluticasone-salmeterol (ADVAIR) 250-50 MCG/ACT inhaler  60 each 11 11/14/2022 --   [address]    Sig: Inhale 1 puff into the lungs 2 times daily    Class: E-Prescribe    Route: Inhalation    montelukast (SINGULAIR) 10 MG tablet  90 tablet 3 2/22/2023 --   [place] [id number]  "[facility] [address]    Sig: Take 1 tablet (10 mg) by mouth At Bedtime.. Takes \"PRN\"    Class: E-Prescribe    Route: Oral           ALLERGIES:    Allergies   Allergen Reactions     No Known Drug Allergy      Pollen Extract      REVIEW OF SYSTEMS:  Review Of Systems  A comprehensive review of systems was performed and found to be negative except as described here or above.  SOCIAL HISTORY:   Social History     Socioeconomic History     Marital status:      Spouse name: Not on file     Number of children: 2     Years of education: 13     Highest education level: High school graduate   Occupational History     Occupation: truss    Tobacco Use     Smoking status: Never     Smokeless tobacco: Former     Types: Chew     Quit date: 2016   Vaping Use     Vaping status: Never Used   Substance and Sexual Activity     Alcohol use: Yes     Comment: 3-4 drinks 1-2x/month     Drug use: No     Sexual activity: Yes     Partners: Female   Other Topics Concern     Parent/sibling w/ CABG, MI or angioplasty before 65F 55M? No   Social History Narrative     Not on file     Social Determinants of Health     Financial Resource Strain: Low Risk  (2/22/2023)    Overall Financial Resource Strain (CARDIA)      Difficulty of Paying Living Expenses: Not hard at all   Food Insecurity: No Food Insecurity (2/22/2023)    Hunger Vital Sign      Worried About Running Out of Food in the Last Year: Never true      Ran Out of Food in the Last Year: Never true   Transportation Needs: No Transportation Needs (2/22/2023)    PRAPARE - Transportation      Lack of Transportation (Medical): No      Lack of Transportation (Non-Medical): No   Physical Activity: Unknown (2/22/2023)    Exercise Vital Sign      Days of Exercise per Week: 3 days      Minutes of Exercise per Session: Patient declined   Stress: No Stress Concern Present (2/22/2023)    [place] [employer] - Occupational Stress Questionnaire      Feeling of Stress : Only " "a little   Social Connections: Unknown (2/22/2023)    Social Connection and Isolation Panel [NHANES]      Frequency of Communication with Friends and Family: More than three times a week      Frequency of Social Gatherings with Friends and Family: Three times a week      Attends [employer] Services: Patient declined      Active Member of Clubs or Organizations: Patient declined      Attends Club or Organization Meetings: Not on file      Marital Status:    Interpersonal Safety: Low Risk  (1/25/2024)    Interpersonal Safety      Do you feel physically and emotionally safe where you currently live?: Yes      Within the past 12 months, have you been hit, slapped, kicked or otherwise physically hurt by someone?: No      Within the past 12 months, have you been humiliated or emotionally abused in other ways by your partner or ex-partner?: No   Housing Stability: Low Risk  (2/22/2023)    Housing Stability Vital Sign      Unable to Pay for Housing in the Last Year: No      Number of Places Lived in the Last Year: 1      Unstable Housing in the Last Year: No     FAMILY MEDICAL HISTORY:   Family History   Problem Relation Age of Onset     Sleep Apnea Father      Asthma Mother      Family History Negative No family hx of      PHYSICAL EXAM:   /84   Pulse 82   Resp 24   Ht 1.753 m (5' 9.02\")   Wt 144.6 kg (318 lb 11.2 oz)   SpO2 96%   BMI 47.04 kg/m    GENERAL APPEARANCE: alert and no distress  EYES: nonicteric  HENT: mouth without ulcers or lesions  NECK: supple, carotids 2+ bilat  RESP: lungs clear to auscultation   CV: regular rhythm, normal rate, no rub  ABDOMEN: soft, nontender, normal bowel sounds, no HSM   Extremities: no clubbing, cyanosis, .  3+ edema to the knees, 1+ to upper thighs  MS: no evidence of inflammation in joints, no muscle tenderness  SKIN: no active lesions or open ulcers/wounds  NEURO: mentation intact and speech normal  PSYCH: affect normal/bright   LABS:   Recent Results (from the " past 672 hour(s))   ALT    Collection Time: 07/11/24 10:21 AM   Result Value Ref Range    ALT 46 0 - 70 U/L   AST    Collection Time: 07/11/24 10:21 AM   Result Value Ref Range    AST 29 0 - 45 U/L   Creatinine    Collection Time: 07/11/24 10:21 AM   Result Value Ref Range    Creatinine 0.94 0.67 - 1.17 mg/dL    GFR Estimate >90 >60 mL/min/1.73m2   CBC with platelets    Collection Time: 07/11/24 10:21 AM   Result Value Ref Range    WBC Count 8.1 4.0 - 11.0 10e3/uL    RBC Count 5.13 4.40 - 5.90 10e6/uL    Hemoglobin 15.4 13.3 - 17.7 g/dL    Hematocrit 45.5 40.0 - 53.0 %    MCV 89 78 - 100 fL    MCH 30.0 26.5 - 33.0 pg    MCHC 33.8 31.5 - 36.5 g/dL    RDW 13.1 10.0 - 15.0 %    Platelet Count 302 150 - 450 10e3/uL   Complement C3    Collection Time: 07/11/24 10:21 AM   Result Value Ref Range    C3 Complement 166 (H) 81 - 157 mg/dL   Complement C4    Collection Time: 07/11/24 10:21 AM   Result Value Ref Range    C4 Complement 25 13 - 39 mg/dL   Rheumatoid factor    Collection Time: 07/11/24 10:21 AM   Result Value Ref Range    Rheumatoid Factor <10 <14 IU/mL   Hepatitis B core antibody    Collection Time: 07/11/24 10:21 AM   Result Value Ref Range    Hepatitis B Core Antibody Total Nonreactive Nonreactive   Hepatitis B surface antigen    Collection Time: 07/11/24 10:21 AM   Result Value Ref Range    Hepatitis B Surface Antigen Nonreactive Nonreactive   Molina Atreaon; VASC; Antineutrophil Cytoplasmic Antibodies Vasculitis Panel, Serum (Laboratory Miscellaneous Order)    Collection Time: 07/11/24 10:21 AM   Result Value Ref Range    See Scanned Result       Specimen received. Reordered and sent to performing laboratory. Report to follow up on completion.    Performing Laboratory [facility]     Test Name       Antineutrophil Cytoplasmic Antibodies Vasculitis Panel, Serum    Test Code VASC    Hemoglobin A1c    Collection Time: 07/11/24 10:21 AM   Result Value Ref Range    Hemoglobin A1C 6.2 (H)  0.0 - 5.6 %   Quantiferon TB Gold Plus Grey Tube    Collection Time: 07/11/24 10:21 AM    Specimen: Peripheral Blood   Result Value Ref Range    Quantiferon Nil Tube 0.03 IU/mL   Quantiferon TB Gold Plus Green Tube    Collection Time: 07/11/24 10:21 AM    Specimen: Peripheral Blood   Result Value Ref Range    Quantiferon TB1 Tube 0.04 IU/mL   Quantiferon TB Gold Plus Yellow Tube    Collection Time: 07/11/24 10:21 AM    Specimen: Peripheral Blood   Result Value Ref Range    Quantiferon TB2 Tube 0.06    Quantiferon TB Gold Plus Purple Tube    Collection Time: 07/11/24 10:21 AM    Specimen: Peripheral Blood   Result Value Ref Range    Quantiferon Mitogen 10.00 IU/mL   Total Protein, Serum for ELP    Collection Time: 07/11/24 10:21 AM   Result Value Ref Range    Total Protein Serum for ELP 6.9 6.4 - 8.3 g/dL   Protein Electrophoresis, Serum    Collection Time: 07/11/24 10:21 AM   Result Value Ref Range    Albumin 4.0 3.7 - 5.1 g/dL    Alpha 1 0.3 0.2 - 0.4 g/dL    Alpha 2 0.7 0.5 - 0.9 g/dL    Beta Globulin 0.9 0.6 - 1.0 g/dL    Gamma Globulin 1.0 0.7 - 1.6 g/dL    Monoclonal Peak 0.0 <=0.0 g/dL    ELP Interpretation       Essentially normal electrophoretic pattern. No obvious monoclonal proteins seen.Pathologic significance requires clinical correlation. Robert Flannery MD   [facility]; Antineutrophil Cytoplasmic Antibodies Vasculitis Panel, Serum [facility] Miscellaneous Test    Collection Time: 07/11/24 10:21 AM   Result Value Ref Range    Molina Result SEE NOTE    CRP inflammation    Collection Time: 07/11/24 10:21 AM   Result Value Ref Range    CRP Inflammation 16.90 (H) <5.00 mg/L   Hepatitis C Screen Reflex to HCV RNA Quant and Genotype    Collection Time: 07/11/24 10:21 AM   Result Value Ref Range    Hepatitis C Antibody Nonreactive Nonreactive   Quantiferon TB Gold Plus    Collection Time: 07/11/24 10:21 AM    Specimen: Peripheral Blood   Result Value Ref Range    Quantiferon-TB Gold Plus Negative Negative    TB1 Ag minus  Nil Value 0.01 IU/mL    TB2 Ag minus Nil Value 0.03 IU/mL    Mitogen minus Nil Result 9.97 IU/mL    Nil Result 0.03 IU/mL   Protein electrophoresis random urine    Collection Time: 07/11/24 10:32 AM   Result Value Ref Range    Albumin Urine 79.4 (H) <=0.0 %    Alpha 1 Urine 3.6 (H) <=0.0 %    Alpha 2 Urine 4.2 (H) <=0.0 %    Beta Globulin Urine 7.2 (H) <=0.0 %    Gamma Globulin Urine 5.6 (H) <=0.0 %    Monocloncal Peak Urine % 0.0 <=0.0 %    ELP Interpretation Urine       Predominantly albumin is seen which suggests glomerular damage. Globulins seen. Several protein bands are seen which may represent monoclonal immunoglobulins or free light chains. Recommend urine immunofixation to further characterize these bands if clinically indicated. Pathologic significance requires clinical correlation. Robert Flannery MD   Protein  random urine    Collection Time: 07/11/24 10:33 AM   Result Value Ref Range    Total Protein Urine mg/dL 122.0   mg/dL    Total Protein Urine mg/mg Creat 0.45 (H) 0.00 - 0.20 mg/mg Cr    Creatinine Urine mg/dL 272.0 mg/dL   UA with Microscopic reflex to Culture    Collection Time: 07/11/24 10:33 AM    Specimen: Urine, Clean Catch   Result Value Ref Range    Color Urine Yellow Colorless, Straw, Light Yellow, Yellow    Appearance Urine Clear Clear    Glucose Urine Negative Negative mg/dL    Bilirubin Urine Small (A) Negative    Ketones Urine Negative Negative mg/dL    Specific Gravity Urine >=1.030 1.005 - 1.030    Blood Urine Large (A) Negative    pH Urine 6.0 5.0 - 8.0    Protein Albumin Urine 100 (A) Negative mg/dL    Urobilinogen Urine 1.0 0.2, 1.0 E.U./dL    Nitrite Urine Negative Negative    Leukocyte Esterase Urine Negative Negative   UA Microscopic with Reflex to Culture    Collection Time: 07/11/24 10:33 AM   Result Value Ref Range    Bacteria Urine Few (A) None Seen /HPF    RBC Urine 25-50 (A) 0-2 /HPF /HPF    WBC Urine 0-5 0-5 /HPF /HPF    Squamous Epithelials Urine Few (A) None Seen  /LPF    Mucus Urine Present (A) None Seen /LPF   Cryoglobulin quantitative    Collection Time: 07/11/24 11:55 AM   Result Value Ref Range    Cryoglobulin Interpretation Negative Negative     CMP  Recent Labs   Lab Test 07/11/24  1021 01/22/24  1358 08/31/17  0000   NA  --  140  --    POTASSIUM  --  4.4 3.8   CHLORIDE  --  101  --    CO2  --  25  --    ANIONGAP  --  14  --    GLC  --  128* 124*   BUN  --  10.2  --    CR 0.94 1.07 1.1   GFRESTIMATED >90 87  --    MIKIE  --  9.3  --    PROTTOTAL  --  6.9  --    ALBUMIN  --  4.2  --    BILITOTAL  --  0.5  --    ALKPHOS  --  69  --    AST 29 23  --    ALT 46 38  --      CBC  Recent Labs   Lab Test 07/11/24  1021 02/28/23  1123 10/18/20  1321   HGB 15.4 16.1 15.8   WBC 8.1 8.4 4.9   RBC 5.13 5.25 5.12   HCT 45.5 48.8 46.2   MCV 89 93 90    280 187     INRNo lab results found.  ABGNo lab results found.   URINE STUDIES  Recent Labs   Lab Test 07/11/24  1033 02/06/24  0837 01/22/24  1358   APPEARANCE Clear Clear Clear   URINEGLC Negative Negative Negative   URINEBILI Small* Negative Negative   URINEKETONE Negative Negative Negative   SG >=1.030 1.025 >=1.030   UBLD Large* Moderate* Small*   URINEPH 6.0 6.0 6.0   PROTEIN 100* 100* Trace*   UROBILINOGEN 1.0 0.2 0.2   NITRITE Negative Negative Negative   LEUKEST Negative Negative Negative   RBCU 25-50* 2-5* 5-10*   WBCU 0-5 0-5 None Seen     Microscopic examination by me of the urine specimen (7/1/7/24), ~ 40 RBC/hpf, many dysmorphic.  No RBC or other casts seen.  Assessment & Plan  Mr Delgado is a 43 yo male with a history of marked leukocytoclastic vasculitis, arthritis/algias, proteinuria and hematuria.  Serologic work up was unrevealing. The overall constellation of finding is consistent and very suggestive of IgA Vasculitis.  A kidney biopsy would be required for confirmation, however this would be technically challenging given his high BMI and body habitus.  At this point, I will focus on managing the edema and  proteinuria.  I concur with Dr Edmondson plan to use a steroid-sparing agent.  Although the GFR is normal, I would favor an alternative choice to methotrexate..  Unfortunately there are no good trial-based information for the treatment of IgA Vasculitis.  There are a number of small trials, and more recently a larger trial from [place] of MMF for IgA Nephropathy (not vasculitis) suggesting benefit.  The advantages of MMF over Methotrexate are likely reduced risks of toxicity, and rapid onset of action and of elimination should it need to be stopped.  I will discuss this option with Dr Edmondson.    In the mean time:  1- Start lisinopril 20 mg daily (will likely need to increase the dose progressively)  2- start furosemide 20 mg twice daily  (will likely need to increase the dose progressively)  3- Will consider adding an SGLT2i at or before the next visit.  Plan in-person follow up in 4 weeks.    I spent a total of 85 minutes on the date of this encounter in reviewing the medical records, face-to-face evaluation and physical exam, review of labs, counseling, orders, care coordination and documentation      Edu West MD  Division of Renal Disease and Hypertension  July 17, 2024      Again, thank you for allowing me to participate in the care of your patient.      Sincerely,    Edu West MD

## 2024-07-17 NOTE — PATIENT INSTRUCTIONS
Please start Furosemide 20 mg twice a day (early morning and early afternoon)  Start lisinopril 20 mg once a day (morning or afternoon)  I will discuss with Dr Patrick for other treatment(s)  I will plan to see you again in about 4 weeks.

## 2024-07-17 NOTE — NURSING NOTE
"Chief Complaint   Patient presents with    Consult     Vasculitis      Vitals:    07/17/24 1009 07/17/24 1010 07/17/24 1011 07/17/24 1021   BP: 133/79 131/89 129/84 131/84   BP Location:       Patient Position:       Cuff Size:       Pulse:       Resp:       SpO2:       Weight:       Height:           BP Readings from Last 3 Encounters:   07/17/24 131/84   07/11/24 (!) 118/100   01/25/24 (!) 116/110       /84   Pulse 82   Resp 24   Ht 1.753 m (5' 9.02\")   Wt 144.6 kg (318 lb 11.2 oz)   SpO2 96%   BMI 47.04 kg/m       Jumana Blackburn, CMA   "

## 2024-07-17 NOTE — PROGRESS NOTES
"Nephrology Clinic    Mario Delgado MRN:[id number] YOB: 1980  Date of Service: 07/17/2024  Primary care provider: Mira Arboleda  Requesting physician:   Mariah Patrick MD         REASON FOR CONSULT: hematuria and proteinuria, history of leucocytoclastic vasculitis and inflammatory arthritis.    HISTORY OF PRESENT ILLNESS:   Mario Delgado is a 44 year old male who presents for evaluation of hematuria and proteinuria.  He developed  purpura over legs, arms, and trunk in January 2024 with biopsy showing leukocytoclastic vasculitis.  The rash would \"come and go\".  Initially treated with antibiotics -> got worse.  Seen by Dermatology -> treated with prednisone (~ Feb), biopsied.  Took ~ 5 months to see Rheumatology.  The rash improved with prednisone, but he experienced joint pain when he stopped the prednisone.   He describes classic migratory polyarthralgias, with described redness and heat.  Currently takes prednisone \"prn\" for joint pain.   Last course of prednisone was in April 2024.  Dr Patrick is considering treatment with Methotrexate.        He has been noted to have hematuria and proteinuria on UA and this has not been worked up.    He underwent an extensive serologic work up which was negative for MPO- and GA-3-ANCA,  SUZIE, double-stranded DNA, Norman, SSA, SSB, and RNP.      Stopped EtOH for a month, reports drinking occasionally only    Today (7/17/24).  C/o L Ext pain related to swelling.  Legs hurt after sitting down more than ~ 20 min.   Energy is \"down\".  Gained 40 lbs over last 6 months.  Current weight is \"highest ever\".  Some ZAPATA and SOB (asthma worse with heat, using albuterol inhaler more often).  Some tightness in upper chest bilaterally, Lasted 40 minutes continuously.  Not related to exertion.  No GI symptoms.  No  symptoms or gross hematuria.  No numbness or tingling other than transiently when sitting.         PAST MEDICAL HISTORY:  Past " "Medical History:   Diagnosis Date    Mild intermittent asthma     Nephrolithiasis    Stones 10-11 yrs ago.  1 definite episode.    S/p shingles 15 yrs ago.   No history of GI bleeding or liver disease.    PAST SURGICAL HISTORY:  Past Surgical History:   Procedure Laterality Date    ENT SURGERY      Nasal Fracture  at age 13    NO HISTORY OF SURGERY     S/p meniscal repair R knee 2023.    MEDICATIONS:  Prescription Medications as of 7/17/2024         Rx Number Disp Refills Start End Last Dispensed Date Next Fill Date Owning Pharmacy    albuterol (PROAIR HFA/PROVENTIL HFA/VENTOLIN HFA) 108 (90 Base) MCG/ACT inhaler  8.5 g 5 3/7/2024 --   [address]    Sig: INHALE 2 PUFFS INTO THE LUNGS EVERY 4 HOURS AS NEEDED FOR SHORTNESS OF BREATH OR WHEEZING    Class: E-Prescribe    Notes to Pharmacy: Pharmacy may dispense brand covered by insurance (Proair, or proventil or ventolin or generic albuterol inhaler)    Route: Inhalation    fluocinonide (LIDEX) 0.05 % external cream  60 g 1 1/31/2024 --   [address]    Sig: Apply topically 2 times daily To affected areas of skin until cleared    Class: E-Prescribe    Route: Topical    fluticasone-salmeterol (ADVAIR) 250-50 MCG/ACT inhaler  60 each 11 11/14/2022 --   [address]    Sig: Inhale 1 puff into the lungs 2 times daily    Class: E-Prescribe    Route: Inhalation    montelukast (SINGULAIR) 10 MG tablet  90 tablet 3 2/22/2023 --   [address]    Sig: Take 1 tablet (10 mg) by mouth At Bedtime.. Takes \"PRN\"    Class: E-Prescribe    Route: Oral           ALLERGIES:    Allergies   Allergen Reactions    No Known Drug Allergy     Pollen Extract      REVIEW OF SYSTEMS:  Review Of Systems  A comprehensive review of systems was performed and found to " be negative except as described here or above.  SOCIAL HISTORY:   Social History     Socioeconomic History    Marital status:      Spouse name: Not on file    Number of children: 2    Years of education: 13    Highest education level: High school graduate   Occupational History    Occupation: truss    Tobacco Use    Smoking status: Never    Smokeless tobacco: Former     Types: Chew     Quit date: 2016   Vaping Use    Vaping status: Never Used   Substance and Sexual Activity    Alcohol use: Yes     Comment: 3-4 drinks 1-2x/month    Drug use: No    Sexual activity: Yes     Partners: Female   Other Topics Concern    Parent/sibling w/ CABG, MI or angioplasty before 65F 55M? No   Social History Narrative    Not on file     Social Determinants of Health     Financial Resource Strain: Low Risk  (2/22/2023)    Overall Financial Resource Strain (CARDIA)     Difficulty of Paying Living Expenses: Not hard at all   Food Insecurity: No Food Insecurity (2/22/2023)    Hunger Vital Sign     Worried About Running Out of Food in the Last Year: Never true     Ran Out of Food in the Last Year: Never true   Transportation Needs: No Transportation Needs (2/22/2023)    PRAPARE - Transportation     Lack of Transportation (Medical): No     Lack of Transportation (Non-Medical): No   Physical Activity: Unknown (2/22/2023)    Exercise Vital Sign     Days of Exercise per Week: 3 days     Minutes of Exercise per Session: Patient declined   Stress: No Stress Concern Present (2/22/2023)    [employer] [place] [employer] - Occupational Stress Questionnaire     Feeling of Stress : Only a little   Social Connections: Unknown (2/22/2023)    Social Connection and Isolation Panel [NHANES]     Frequency of Communication with Friends and Family: More than three times a week     Frequency of Social Gatherings with Friends and Family: Three times a week     Attends [employer] Services: Patient declined     Active Member of  "Clubs or Organizations: Patient declined     Attends Club or Organization Meetings: Not on file     Marital Status:    Interpersonal Safety: Low Risk  (1/25/2024)    Interpersonal Safety     Do you feel physically and emotionally safe where you currently live?: Yes     Within the past 12 months, have you been hit, slapped, kicked or otherwise physically hurt by someone?: No     Within the past 12 months, have you been humiliated or emotionally abused in other ways by your partner or ex-partner?: No   Housing Stability: Low Risk  (2/22/2023)    Housing Stability Vital Sign     Unable to Pay for Housing in the Last Year: No     Number of Places Lived in the Last Year: 1     Unstable Housing in the Last Year: No     FAMILY MEDICAL HISTORY:   Family History   Problem Relation Age of Onset    Sleep Apnea Father     Asthma Mother     Family History Negative No family hx of      PHYSICAL EXAM:   /84   Pulse 82   Resp 24   Ht 1.753 m (5' 9.02\")   Wt 144.6 kg (318 lb 11.2 oz)   SpO2 96%   BMI 47.04 kg/m    GENERAL APPEARANCE: alert and no distress  EYES: nonicteric  HENT: mouth without ulcers or lesions  NECK: supple, carotids 2+ bilat  RESP: lungs clear to auscultation   CV: regular rhythm, normal rate, no rub  ABDOMEN: soft, nontender, normal bowel sounds, no HSM   Extremities: no clubbing, cyanosis, .  3+ edema to the knees, 1+ to upper thighs  MS: no evidence of inflammation in joints, no muscle tenderness  SKIN: no active lesions or open ulcers/wounds  NEURO: mentation intact and speech normal  PSYCH: affect normal/bright   LABS:   Recent Results (from the past 672 hour(s))   ALT    Collection Time: 07/11/24 10:21 AM   Result Value Ref Range    ALT 46 0 - 70 U/L   AST    Collection Time: 07/11/24 10:21 AM   Result Value Ref Range    AST 29 0 - 45 U/L   Creatinine    Collection Time: 07/11/24 10:21 AM   Result Value Ref Range    Creatinine 0.94 0.67 - 1.17 mg/dL    GFR Estimate >90 >60 mL/min/1.73m2 "   CBC with platelets    Collection Time: 07/11/24 10:21 AM   Result Value Ref Range    WBC Count 8.1 4.0 - 11.0 10e3/uL    RBC Count 5.13 4.40 - 5.90 10e6/uL    Hemoglobin 15.4 13.3 - 17.7 g/dL    Hematocrit 45.5 40.0 - 53.0 %    MCV 89 78 - 100 fL    MCH 30.0 26.5 - 33.0 pg    MCHC 33.8 31.5 - 36.5 g/dL    RDW 13.1 10.0 - 15.0 %    Platelet Count 302 150 - 450 10e3/uL   Complement C3    Collection Time: 07/11/24 10:21 AM   Result Value Ref Range    C3 Complement 166 (H) 81 - 157 mg/dL   Complement C4    Collection Time: 07/11/24 10:21 AM   Result Value Ref Range    C4 Complement 25 13 - 39 mg/dL   Rheumatoid factor    Collection Time: 07/11/24 10:21 AM   Result Value Ref Range    Rheumatoid Factor <10 <14 IU/mL   Hepatitis B core antibody    Collection Time: 07/11/24 10:21 AM   Result Value Ref Range    Hepatitis B Core Antibody Total Nonreactive Nonreactive   Hepatitis B surface antigen    Collection Time: 07/11/24 10:21 AM   Result Value Ref Range    Hepatitis B Surface Antigen Nonreactive Nonreactive   [facility]; VASC; Antineutrophil Cytoplasmic Antibodies Vasculitis Panel, Serum (Laboratory Miscellaneous Order)    Collection Time: 07/11/24 10:21 AM   Result Value Ref Range    See Scanned Result       Specimen received. Reordered and sent to performing laboratory. Report to follow up on completion.    Performing Laboratory [facility]     Test Name       Antineutrophil Cytoplasmic Antibodies Vasculitis Panel, Serum    Test Code VASC    Hemoglobin A1c    Collection Time: 07/11/24 10:21 AM   Result Value Ref Range    Hemoglobin A1C 6.2 (H) 0.0 - 5.6 %   Quantiferon TB Gold Plus Grey Tube    Collection Time: 07/11/24 10:21 AM    Specimen: Peripheral Blood   Result Value Ref Range    Quantiferon Nil Tube 0.03 IU/mL   Quantiferon TB Gold Plus Green Tube    Collection Time: 07/11/24 10:21 AM    Specimen: Peripheral Blood   Result Value Ref Range    Quantiferon TB1 Tube 0.04 IU/mL    Quantiferon TB Gold Plus Yellow Tube    Collection Time: 07/11/24 10:21 AM    Specimen: Peripheral Blood   Result Value Ref Range    Quantiferon TB2 Tube 0.06    Quantiferon TB Gold Plus Purple Tube    Collection Time: 07/11/24 10:21 AM    Specimen: Peripheral Blood   Result Value Ref Range    Quantiferon Mitogen 10.00 IU/mL   Total Protein, Serum for ELP    Collection Time: 07/11/24 10:21 AM   Result Value Ref Range    Total Protein Serum for ELP 6.9 6.4 - 8.3 g/dL   Protein Electrophoresis, Serum    Collection Time: 07/11/24 10:21 AM   Result Value Ref Range    Albumin 4.0 3.7 - 5.1 g/dL    Alpha 1 0.3 0.2 - 0.4 g/dL    Alpha 2 0.7 0.5 - 0.9 g/dL    Beta Globulin 0.9 0.6 - 1.0 g/dL    Gamma Globulin 1.0 0.7 - 1.6 g/dL    Monoclonal Peak 0.0 <=0.0 g/dL    ELP Interpretation       Essentially normal electrophoretic pattern. No obvious monoclonal proteins seen.Pathologic significance requires clinical correlation. Robert Flannery MD   [facility]; Antineutrophil Cytoplasmic Antibodies Vasculitis Panel, Serum [place] Miscellaneous Test    Collection Time: 07/11/24 10:21 AM   Result Value Ref Range    [place] Result SEE NOTE    CRP inflammation    Collection Time: 07/11/24 10:21 AM   Result Value Ref Range    CRP Inflammation 16.90 (H) <5.00 mg/L   Hepatitis C Screen Reflex to HCV RNA Quant and Genotype    Collection Time: 07/11/24 10:21 AM   Result Value Ref Range    Hepatitis C Antibody Nonreactive Nonreactive   Quantiferon TB Gold Plus    Collection Time: 07/11/24 10:21 AM    Specimen: Peripheral Blood   Result Value Ref Range    Quantiferon-TB Gold Plus Negative Negative    TB1 Ag minus Nil Value 0.01 IU/mL    TB2 Ag minus Nil Value 0.03 IU/mL    Mitogen minus Nil Result 9.97 IU/mL    Nil Result 0.03 IU/mL   Protein electrophoresis random urine    Collection Time: 07/11/24 10:32 AM   Result Value Ref Range    Albumin Urine 79.4 (H) <=0.0 %    Alpha 1 Urine 3.6 (H) <=0.0 %    Alpha 2 Urine 4.2 (H) <=0.0 %    Beta Globulin  Urine 7.2 (H) <=0.0 %    Gamma Globulin Urine 5.6 (H) <=0.0 %    Monocloncal Peak Urine % 0.0 <=0.0 %    ELP Interpretation Urine       Predominantly albumin is seen which suggests glomerular damage. Globulins seen. Several protein bands are seen which may represent monoclonal immunoglobulins or free light chains. Recommend urine immunofixation to further characterize these bands if clinically indicated. Pathologic significance requires clinical correlation. Robert Flannery MD   Protein  random urine    Collection Time: 07/11/24 10:33 AM   Result Value Ref Range    Total Protein Urine mg/dL 122.0   mg/dL    Total Protein Urine mg/mg Creat 0.45 (H) 0.00 - 0.20 mg/mg Cr    Creatinine Urine mg/dL 272.0 mg/dL   UA with Microscopic reflex to Culture    Collection Time: 07/11/24 10:33 AM    Specimen: Urine, Clean Catch   Result Value Ref Range    Color Urine Yellow Colorless, Straw, Light Yellow, Yellow    Appearance Urine Clear Clear    Glucose Urine Negative Negative mg/dL    Bilirubin Urine Small (A) Negative    Ketones Urine Negative Negative mg/dL    Specific Gravity Urine >=1.030 1.005 - 1.030    Blood Urine Large (A) Negative    pH Urine 6.0 5.0 - 8.0    Protein Albumin Urine 100 (A) Negative mg/dL    Urobilinogen Urine 1.0 0.2, 1.0 E.U./dL    Nitrite Urine Negative Negative    Leukocyte Esterase Urine Negative Negative   UA Microscopic with Reflex to Culture    Collection Time: 07/11/24 10:33 AM   Result Value Ref Range    Bacteria Urine Few (A) None Seen /HPF    RBC Urine 25-50 (A) 0-2 /HPF /HPF    WBC Urine 0-5 0-5 /HPF /HPF    Squamous Epithelials Urine Few (A) None Seen /LPF    Mucus Urine Present (A) None Seen /LPF   Cryoglobulin quantitative    Collection Time: 07/11/24 11:55 AM   Result Value Ref Range    Cryoglobulin Interpretation Negative Negative     CMP  Recent Labs   Lab Test 07/11/24  1021 01/22/24  1358 08/31/17  0000   NA  --  140  --    POTASSIUM  --  4.4 3.8   CHLORIDE  --  101  --    CO2  --   25  --    ANIONGAP  --  14  --    GLC  --  128* 124*   BUN  --  10.2  --    CR 0.94 1.07 1.1   GFRESTIMATED >90 87  --    MIKIE  --  9.3  --    PROTTOTAL  --  6.9  --    ALBUMIN  --  4.2  --    BILITOTAL  --  0.5  --    ALKPHOS  --  69  --    AST 29 23  --    ALT 46 38  --      CBC  Recent Labs   Lab Test 07/11/24  1021 02/28/23  1123 10/18/20  1321   HGB 15.4 16.1 15.8   WBC 8.1 8.4 4.9   RBC 5.13 5.25 5.12   HCT 45.5 48.8 46.2   MCV 89 93 90    280 187     INRNo lab results found.  ABGNo lab results found.   URINE STUDIES  Recent Labs   Lab Test 07/11/24  1033 02/06/24  0837 01/22/24  1358   APPEARANCE Clear Clear Clear   URINEGLC Negative Negative Negative   URINEBILI Small* Negative Negative   URINEKETONE Negative Negative Negative   SG >=1.030 1.025 >=1.030   UBLD Large* Moderate* Small*   URINEPH 6.0 6.0 6.0   PROTEIN 100* 100* Trace*   UROBILINOGEN 1.0 0.2 0.2   NITRITE Negative Negative Negative   LEUKEST Negative Negative Negative   RBCU 25-50* 2-5* 5-10*   WBCU 0-5 0-5 None Seen     Microscopic examination by me of the urine specimen (7/1/7/24), ~ 40 RBC/hpf, many dysmorphic.  No RBC or other casts seen.  Assessment & Plan   Mr Delgado is a 43 yo male with a history of marked leukocytoclastic vasculitis, arthritis/algias, proteinuria and hematuria.  Serologic work up was unrevealing. The overall constellation of finding is consistent and very suggestive of IgA Vasculitis.  A kidney biopsy would be required for confirmation, however this would be technically challenging given his high BMI and body habitus.  At this point, I will focus on managing the edema and proteinuria.  I concur with Dr Edmondson plan to use a steroid-sparing agent.  Although the GFR is normal, I would favor an alternative choice to methotrexate..  Unfortunately there are no good trial-based information for the treatment of IgA Vasculitis.  There are a number of small trials, and more recently a larger trial from [place] of MMF  for IgA Nephropathy (not vasculitis) suggesting benefit.  The advantages of MMF over Methotrexate are likely reduced risks of toxicity, and rapid onset of action and of elimination should it need to be stopped.  I will discuss this option with Dr Edmondson.    In the mean time:  1- Start lisinopril 20 mg daily (will likely need to increase the dose progressively)  2- start furosemide 20 mg twice daily  (will likely need to increase the dose progressively)  3- Will consider adding an SGLT2i at or before the next visit.  Plan in-person follow up in 4 weeks.    I spent a total of 85 minutes on the date of this encounter in reviewing the medical records, face-to-face evaluation and physical exam, review of labs, counseling, orders, care coordination and documentation      Edu West MD  Division of Renal Disease and Hypertension  July 17, 2024

## 2024-07-23 DIAGNOSIS — I77.6 VASCULITIS (H): Primary | ICD-10-CM

## 2024-07-24 ENCOUNTER — PATIENT OUTREACH (OUTPATIENT)
Dept: NEPHROLOGY | Facility: CLINIC | Age: 44
End: 2024-07-24
Payer: COMMERCIAL

## 2024-07-24 NOTE — PROGRESS NOTES
Update from Dr. West of patients follow up needs:    Please start Furosemide 20 mg twice a day (early morning and early afternoon)  Start lisinopril 20 mg once a day (morning or afternoon)  I will discuss with Dr Patrick for other treatment(s)  I will plan to see you again in about 4 weeks.    Reached out to patient and voicemail/MyChart left of instructions as well as availability of add on clinic for 8/21 for 10 or 130am appt with labs 1 week before or 1hr before appt.  Instructions to call Vasculitis Program Nurse line left for timely support.  Yoly Gruber RN, BSN, PHN  Vasculitis & Lupus Program Nephrology Nurse Navigator  [phone]

## 2024-07-26 ENCOUNTER — VIRTUAL VISIT (OUTPATIENT)
Dept: PALLIATIVE MEDICINE | Facility: OTHER | Age: 44
End: 2024-07-26
Attending: STUDENT IN AN ORGANIZED HEALTH CARE EDUCATION/TRAINING PROGRAM
Payer: COMMERCIAL

## 2024-07-26 DIAGNOSIS — I10 ESSENTIAL HYPERTENSION: ICD-10-CM

## 2024-07-26 DIAGNOSIS — J45.40 MODERATE PERSISTENT ASTHMA WITHOUT COMPLICATION: ICD-10-CM

## 2024-07-26 DIAGNOSIS — I77.6 VASCULITIS (H): Primary | ICD-10-CM

## 2024-07-26 DIAGNOSIS — Z71.85 VACCINE COUNSELING: ICD-10-CM

## 2024-07-26 DIAGNOSIS — G47.00 INSOMNIA, UNSPECIFIED TYPE: ICD-10-CM

## 2024-07-26 RX ORDER — METHOTREXATE 2.5 MG/1
10 TABLET ORAL
Qty: 16 TABLET | Refills: 2 | Status: SHIPPED | OUTPATIENT
Start: 2024-07-26 | End: 2024-08-16

## 2024-07-26 RX ORDER — FOLIC ACID 1 MG/1
1 TABLET ORAL DAILY
Qty: 90 TABLET | Refills: 1 | Status: SHIPPED | OUTPATIENT
Start: 2024-07-26 | End: 2024-08-16

## 2024-07-26 NOTE — PATIENT INSTRUCTIONS
"Recommendations from today's MTM visit:                                                    MTM (medication therapy management) is a service provided by a clinical pharmacist designed to help you get the most of out of your medicines.   Today we reviewed what your medicines are for, how to know if they are working, that your medicines are safe and how to make your medicine regimen as easy as possible.      Start methotrexate 10 mg (4 tablets) by mouth once weekly  Start folic acid 1 mg once daily  Have labs checked 2 weeks after starting methotrexate  Limit alcohol to 2-3 drinks/week  Can use acetaminophen (Tylenol) up to 2000 mg per day  Consider receiving the following vaccinations:  Prevnar 20 (pneumococcal)  Shingrix (shingles) - 2 dose series    Follow-up: Return in 6 weeks (on 9/6/2024) for Follow up, with me, using a phone visit.    It was great speaking with you today.  I value your experience and would be very thankful for your time in providing feedback in our clinic survey. In the next few days, you may receive an email or text message from [employer] with a link to a survey related to your  clinical pharmacist.\"     To schedule another MTM appointment, please call the clinic directly or you may call the MTM scheduling line at [phone] or toll-free at [phone].     My Clinical Pharmacist's contact information:                                                      Please feel free to contact me with any questions or concerns you have.      Marshal Wolfe, PharmD  Medication Therapy Management Pharmacist  [facility]  Phone: [phone]   "

## 2024-07-26 NOTE — Clinical Note
I ordered Cr, AST/ALT, and CBC to be checked in 2 weeks. Should time out well where if things are going well the dose of methotrexate can be increased to 15 mg/week at his visit with you on 8/16.

## 2024-07-26 NOTE — PROGRESS NOTES
Medication Therapy Management (MTM) Encounter    ASSESSMENT:                            Medication Adherence/Access: No issues identified    Vasculitis: Provided education on methotrexate today including dosing, general administration, side effects (both common/serious), precautions, monitoring and time to efficacy. Discussed data on malignancy and risk of serious infection in depth. Discussed potential need to hold therapy in the setting of signs/symptoms of active infection. Encouraged him to contact the rheumatology clinic in the event he has questions on this. Would benefit from starting methotrexate 10 mg once weekly and folic acid 1 mg once daily. Recommend having drug toxicity labs checked 2 weeks after start and increase methotrexate to 15 mg once weekly pending lab results and tolerability.     Insomnia: Well controlled on current regimen    Hypertension: Stable. Patient is meeting blood pressure goal of < 140/90mmHg.     Asthma: Stable. Patient would benefit from using Advair twice a day and montelukast once daily to help reduce his usage of albuterol.    Vaccines: Encouraged indicated non-live vaccines. Per ACIP guidelines, patient is eligible for Pneumococcal (Prevnar 20) and Zoster     PLAN:                            Start methotrexate 10 mg (4 tablets) by mouth once weekly  Start folic acid 1 mg once daily  Have labs checked 2 weeks after starting methotrexate  Limit alcohol to 2-3 drinks/week  Can use acetaminophen (Tylenol) up to 2000 mg per day  Consider receiving the following vaccinations:  Prevnar 20 (pneumococcal)  Shingrix (shingles) - 2 dose series    Follow-up: Return in 6 weeks (on 9/6/2024) for Follow up, with me, using a phone visit.    SUBJECTIVE/OBJECTIVE:                          Jose Delgado is a 44 year old male seen for an initial visit. He was referred to me from Mariah Patrick MD.      Reason for visit: Methotrexate new start for vasculitis.    Allergies/ADRs: Reviewed in  chart  Past Medical History: Reviewed in chart  Tobacco: He reports that he has never smoked. He quit smokeless tobacco use about 8 years ago.  His smokeless tobacco use included chew.  Alcohol: 4-5 beverages / week  Other Substance Use: THC 5-10 mg gummies as needed to help with pain and sleep at night  Social: 2 children: 11 and 9 year old    Medication Adherence/Access: no issues reported    Vasculitis:  Fluocinonide 0.05% cream once daily as needed    Patient reports his symptoms started in January with a rash over his legs, arms, and truck. He was treated with prednisone and his skin symptoms are improving. Continues to use fluocinonide to help with itch. The prednisone caused weight gain of approximately 50 lbs. He has joint pain in his knees and elbows. His legs are swollen and he has to loosen his boots because they are so swollen. Was recently started on furosemide by nephrology and has found this helpful. He states he has not had to get up overnight as much to use bathroom (previously got up 3-4 times/night, down to 1-2) and has noticed his legs are less swollen. Plan from his rheumatologist is to start methotrexate.    Last lab monitoring completed: 7/11/2024    Reviewed baseline pre-biologic screening.   Hep C antibody non-reactive 7/11/2024  Hep B surface antigen non-reactive 7/11/2024  Hep B core antibody non-reactive 7/11/2024  Quantiferon TB Negative 7/11/2024  HIV antigen non-reactive 2/28/2023    Insomnia:  Previously used trazodone and found it caused crazy dreams and he had excessive drowsiness with a hang over effect the next day. Finds the THC gummies effective.    Hypertension   Lisinopril 20 mg once daily  Furosemide 20 mg twice daily    Patient reports no current medication side effects. Had cough for about 1 day after starting lisinopril. Both medications started by nephrology     Asthma   Advair 250/50 mcg one to two times daily (usually just once daily) - prescribed as twice  daily  Albuterol MDI 2 puffs three daily  Montelukast 10 mg at bedtime twice a week    Patient rinses their mouth after using steroid inhaler.   Patient reports no current medication side effects.      Patient reports the following symptoms: none.     Vaccines:  Immunization History   Administered Date(s) Administered    TDAP (Adacel,Boostrix) 11/14/2022    TDAP Vaccine (Adacel) 10/18/2012     Reports he had shingles infection approximately 12 years ago.    Today's Vitals: There were no vitals taken for this visit.  ----------------    I spent 51 minutes with this patient today. All changes were made via collaborative practice agreement with Mariah Patrick. A copy of the visit note was provided to the patient's provider(s).    A summary of these recommendations was sent via Click[employer].    Marshal Wolfe, PharmD  Medication Therapy Management Pharmacist  [facility]  Phone: [phone]    Telemedicine Visit Details  Type of service:  Telephone visit  Start Time:  2:00 PM  End Time: 2:52 PM     Medication Therapy Recommendations  Moderate persistent asthma    Current Medication: fluticasone-salmeterol (ADVAIR) 250-50 MCG/ACT inhaler   Rationale: Patient forgets to take - Adherence - Adherence   Recommendation: Provide Education   Status: Patient Agreed - Adherence/Education          Current Medication: montelukast (SINGULAIR) 10 MG tablet   Rationale: Patient forgets to take - Adherence - Adherence   Recommendation: Provide Education   Status: Patient Agreed - Adherence/Education         Vaccine counseling    Rationale: Preventive therapy - Needs additional medication therapy - Indication   Recommendation: Provide Education   Status: Patient Agreed - Adherence/Education         Vasculitis (H24)    Current Medication: methotrexate 2.5 MG tablet   Rationale: Does not understand instructions - Adherence - Adherence   Recommendation: Provide Education   Status: Patient Agreed -  Adherence/Education   Note: Methotrexate new start education provided          Current Medication: methotrexate 2.5 MG tablet   Rationale: Medication requires monitoring - Needs additional monitoring   Recommendation: Order Lab   Status: Accepted per Provider

## 2024-07-29 ENCOUNTER — PATIENT OUTREACH (OUTPATIENT)
Dept: NEPHROLOGY | Facility: CLINIC | Age: 44
End: 2024-07-29
Payer: COMMERCIAL

## 2024-07-29 NOTE — PROGRESS NOTES
Voicemail and Data Driven Delivery Systemhart sent to support follow up appt, for August 21 at 10am, 945am check in with labs a week before appt.  Yoly Gruber RN, BSN, PHN  Vasculitis & Lupus Program Nephrology Nurse Navigator  [phone]

## 2024-07-31 ENCOUNTER — PATIENT OUTREACH (OUTPATIENT)
Dept: NEPHROLOGY | Facility: CLINIC | Age: 44
End: 2024-07-31
Payer: COMMERCIAL

## 2024-07-31 DIAGNOSIS — R80.9 PROTEINURIA: ICD-10-CM

## 2024-07-31 DIAGNOSIS — I77.6 VASCULITIS (H): Primary | ICD-10-CM

## 2024-07-31 NOTE — PROGRESS NOTES
Vasculitis and Lupus Program Note: Patient Outreach Encounter    REASON FOR CALL:     REASON FOR CALL: Vasculitis Program Care Coordination (Clinic follow up)                                SITUATION/BACKROUND:   Patient is being treated for Vasculitis.    Per provider instruction, writer to follow up on medication change. and lab results.      ASSESSMENT:     Reached out to patient who reports tolerating all the new meds initiated by Dr. West as well as Dr. Patrick.   Patient has not been able to check his BP at home and ok with ordering cuff for closer mgmt of BP and effectiveness of BP meds started.    Nurse Assessments:    Recent Labs      Latest Ref Rng & Units 7/11/2024 1/22/2024 2/28/2023   Neph Labs   Sodium 135 - 145 mmol/L 139  140     GFR Estimate >60 mL/min/1.73m2  >60 mL/min/1.73m2 >90     >90  87     Potassium 3.4 - 5.3 mmol/L 4.3  4.4     Creatinine 0.67 - 1.17 mg/dL  0.67 - 1.17 mg/dL 0.94     0.94  1.07     Urea Nitrogen 6.0 - 20.0 mg/dL 10.1  10.2     Hemoglobin 13.3 - 17.7 g/dL 15.4   16.1    Hemoglobin A1C 0.0 - 5.6 % 6.2          Multiple values from one day are sorted in reverse-chronological order       Medications  Nephology medication reconciliation completed: Yes  Any barriers to taking medication(s) as prescribed?  No  Taking over the counter medication(s?) No    Current Outpatient Medications (Antihypertensive, Cardiovascular, Diuretics, Beta blockers, Calcium blockers, Anticoagulants)   Medication Sig    furosemide (LASIX) 20 MG tablet Take 1 tablet (20 mg) by mouth 2 times daily    lisinopril (ZESTRIL) 20 MG tablet Take 1 tablet (20 mg) by mouth daily      folic acid (FOLVITE) 1 MG tablet Take 1 tablet (1 mg) by mouth daily    methotrexate 2.5 MG tablet Take 4 tablets (10 mg) by mouth every 7 days     Current Outpatient Medications (Other)   Medication Sig    albuterol (PROAIR HFA/PROVENTIL HFA/VENTOLIN HFA) 108 (90 Base) MCG/ACT inhaler INHALE 2 PUFFS INTO THE LUNGS EVERY 4  HOURS AS NEEDED FOR SHORTNESS OF BREATH OR WHEEZING    fluocinonide (LIDEX) 0.05 % external cream Apply topically 2 times daily To affected areas of skin until cleared    fluticasone-salmeterol (ADVAIR) 250-50 MCG/ACT inhaler Inhale 1 puff into the lungs 2 times daily    montelukast (SINGULAIR) 10 MG tablet Take 1 tablet (10 mg) by mouth At Bedtime    traZODone (DESYREL) 50 MG tablet Take 1-2 tablets ( mg) by mouth At Bedtime     PLAN:     Education:  Method:  general discussion/verbal explanation  Discussed: managing blood pressure  medications  health status  These interventions were used: Collaboration, Evocation, Support autonomy  Education material provided and patient was given an opportunity to ask questions.      Asked and refilled prescriptions per provider and sent to pt's preferred pharmacy.     Follow Up:   Instructed patient to follow up call/MyChart about appt for Aug 21 if he can make or not.    Patient to call/MyChart message with updates or concerns before that time by calling Vasculitis Program nurse line.  Medication Change: Discussed medication change with patient  BP cuff ordered and faxed to [facility] location [phone]\  P: [phone]  F: [phone]    Patient verbalized understanding and will follow up as recommended.    Yoly Gruber RN  Vasculitis and Lupus Program: [phone]

## 2024-08-07 ENCOUNTER — PATIENT OUTREACH (OUTPATIENT)
Dept: NEPHROLOGY | Facility: CLINIC | Age: 44
End: 2024-08-07
Payer: COMMERCIAL

## 2024-08-07 NOTE — PROGRESS NOTES
Voicemail and MyChart sent to schedule/confirm appt for next Wednesday at 10am.  Yoly Gruber RN, BSN, PHN  Vasculitis & Lupus Program Nephrology Nurse Navigator  [phone]

## 2024-08-09 NOTE — PROGRESS NOTES
Updated from Jose that he can make 8/22 at 430 with labs before appt vs mid day on Wednesday.  Updated schedule and MyChart sent to confirm as patient busy.  Yoly Gruber RN, BSN, PHN  Vasculitis & Lupus Program Nephrology Nurse Navigator  [phone]

## 2024-08-15 NOTE — PROGRESS NOTES
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  He denies any history or current fevers, weight loss, abdominal pain, sinusitis, hearing loss, external ear or nose cartilage inflammation.    He has been noted to have hematuria and proteinuria on UA and was seen by Dr. West in nephrology 7/2024. No current plan for renal biopsy, with plan to focus on edema and proteinuria management and recommendation to consider alternative choice to methotrexate given possible renal toxicity with methotrexate.     His presentation of inflammatory arthritis  and recurrent cutaneous vasculitis with necrosis, hematuria and proteinuria, raise concern for systemic involvement.  Will obtain CT abdomen to look for mesenteric vessel involvement which can support the diagnosis of PAN.  Workup for ANCA vasculitis has been negative with negative MPO and FL-3.  RF and cryoglobulins are negative. Complements normal/high.       No signs or symptoms to suggest a vasculitis associated with an underlying systemic autoimmune disease such as lupus or Sjogren's and with negative SUZIE, double-stranded DNA, Norman, SSA, SSB, and RNP.  Vasculitis associated with underlying infection seems less likely given that he is overall appears well and has had a negative HIV and hep C and hep B serologies.  No red flag symptoms for underlying malignancy.      Plan:   -Stop Methotrexate and folic acid  -Get lab work today : CBC, Cr, AST, ALT, esr and crp   -Start Mycophenolate 500mg twice a day and repeat lab work in 2 weeks (CBC, Cr, AST, ALT, esr and crp), will titrate up to 1,000mg BID  Referred by: Marcelo Roger MD; Medical Diagnosis (from order):    Diagnosis Information      Diagnosis    V58.89, 844.1 (ICD-9-CM) - S83.411D (ICD-10-CM) - Tear of medial collateral ligament of right knee, subsequent encounter    719.46, 719.06 (ICD-9-CM) - M25.561, M25.461 (ICD-10-CM) - Pain and swelling of right knee                Physical Therapy -  Daily Treatment Note    Visit:  4     SUBJECTIVE                                                                                                             Muscles are sore but not the knee.      OBJECTIVE                                                                                                                        TREATMENT                                                                                                                  Therapeutic Exercise:  Name: Billy ZAMORA   Diagnosis:   Right MCL tear, knee pain         Precautions:    Today's Exercises:     Passive right hamstring, and prone quad  Passive supine hip flexor stretch - deferred    Manual Therapy:  Rolled right Iliotibial band and quad with stick    Neuromuscular Re-Education:  Neuromuscular Re-education to improve quality of motion , improve posture and postural awareness, improve proprioception, improve balance, improve coordination  and improve kinesthetic sense:      Dynamic warm up (toe/heel walk, soldiers, knee to chest, fire hydrants, piriformis, spiderman, inchworm)  Chair squat with green band at knee 3 second up and down 2x10 - deferred  Tipsy bird to 12\" box with 10# x 10 on right - deferred  3 way hip quick movements blue 2x10 bilateral - deferred    40# cable dead bug 2x10 - deferred  Hip hinges with hip external rotation purple 3x10  Lateral walking purple with 5 taps x 3 laps  Monster walk fwd/bwd purple x 5 laps - deferred  Air squats holding 10# 2x10 - deferred  Lateral lunges to balance holding 2-10# dumbells 3x5 each side superset with lateral zig zag hopping length of  "  -Counseled pt to schedule CTA abdomen and pelvis  -Counseled pt to schedule Ophthalmology appointment for complete eye exam to assess for vasculitic involvement      High risk medication use  Comment: MMF  hepatitis C negative 2/2023  Quant gold neg 7/2024   Hep B neg 7/2024  Plan:  -Discussed common side effects of MMF including  GI upset.  Discussed need for periodic lab operatory monitoring for kidney, liver, and blood counts.    -Toxicity monitoring labs 2 weeks after initiation and dosage changes and otherwise every 3 months  -MTM following         Orders Placed This Encounter   Procedures    Erythrocyte sedimentation rate auto    CRP inflammation        The longitudinal plan of care for the diagnosis(es)/condition(s) as documented were addressed during this visit. Due to the added complexity in care, I will continue to support Jose in the subsequent management and with ongoing continuity of care.    RTC in 2-3 months     30 minutes spent on the day of the encounter doing chart review, history and exam, counseling and documentation.     Subjective     Reports that since starting the Methotrexate (has been on this for 3 weeks now) has had decrease in LE swelling and leg pain. (Improvement of swelling likely due to furosemide start). Has had more energy. Rash has improved and no new lesions. Continues to deny abdominal pain, fevers, respiratory issues, or sinus issues.   Had two cortisone shots in his knees  Saw Dr. West and per note, \"The overall constellation of finding is consistent and very suggestive of IgA Vasculitis.  A kidney biopsy would be required for confirmation, however this would be technically challenging given his high BMI and body habitus.  At this point, I will focus on managing the edema and proteinuria.  I concur with Dr Edmondson plan to use a steroid-sparing agent.  Although the GFR is normal, I would favor an alternative choice to methotrexate.. \" He was also started on " gym  Captains purple 2x10  Rotational step up to balance 12\" x 15 holding 12# dumbells - deferred  PNF with green band extension/abduction into flexion/adduction/external rotation 2x10  TRX hamstring alternating curls 3x10 each leg  6\" eccentric step downs: anterior 1x10 and lateral 2x10 - deferred  Posterior lunge with single leg balance with 10# dumbell 3x5 superset with box jumps  18\" Box jump 3x3  Lateral alvaro quick feet 3-6\" hurdles 10 seconds x 3  Single leg anterior alvaro jump 3-6\" hurdles x 5 each    Skilled input: verbal instruction/cues    Home Exercise Program: See chart copy  Access code: [email or web address]    Cryotherapy (90447): Cold Pack  Location: right knee  Duration: 10 minutes    ASSESSMENT                                                                                                             Patient has increased tightness along right quad. Increased soreness when rolling quad with stick.  He is able to perform all exercises without increased symptoms. He is challenged by single leg activity due to weakness and decreased balance.           Procedures and total treatment time documented Time Entry flowsheet.   "lisinopril and furosemide.       HPI    Referred by Dr. 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    In Jan developed a rash over his legs over the course of a week which was very itchy. Later developed joint pain and swelling over his knees, elbows, fingers. Couldn't make a fist due to swelling in his knuckles. Felt the pain migrated around his joints. No fevers, no abdominal pain, no diarrhea or blood in stool. Initially was put on some antibiotic which he reports made the rash worse.  Was started on prednisone 40mg in early February with taper- down by 5mg every 4 days. Reports this helped his joint pain which resolved after a few weeks and the rash slowly improved over time but continued to have \"flares\" of the rash that would start on his legs and spread up his arms and then self resolved after 3 to 4 days.  He continues to have scabs over his legs that he feels are not healing well.  He will intermittently take prednisone for the rash but does not like to take this due to the weight gain. Last took prednisone 2-3 weeks ago, 10mg as noticed his skin flaring. Feels it has flared 5-6 times since January.  Denies any new medications around the time of his initial symptom onset.  We reviewed a list of common medications that cause vasculitis today and he confirms and these were started.      Has a history of nephrolithiasis about a decade ago.   History of shingles around his waist 15 years ago     No raynaud's, no cartilage swelling, no recurrent sinus infections, no hearing loss,   no oral or nasal ulcers, no hx of inflammatory eye disease, no other rashes,  +crusty eyes in the morning and increased tearing, no dry eyes or dry mouth,  no dyspnea, no cough, no chest pain, no fevers or weigh loss, no hx of blood clots,  no numbness or tingling, no muscle pain.    Grandfather with possible RA, otherwise no family history of " autoimmune disease   Works at a [employer]. 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   Not a previous smoker. Not a current smoker.       Lab and Imaging review:    I reviewed recent labs and imaging includin/2024  Neg/normal cryoglobulins, RF, C4  High C3   Crp 17   MPO and PR3 neg    2024 proteinuria and hematuria   Normal CMP  Neg/normal SSA, SSB, Norman, RNP, streptolysin O, MPO, PR3, SUZIE, dsDNA,   Hep C non-reactive, HIV neg      2024 Dermatopathology     Final Diagnosis   A(1). Abdomen:  - Consistent with evolving leukocytoclastic vasculitis - (see comment and description)    - Nonspecific but supportive immunofluorescence study, without evidence of IgA vasculitis - (see description)   Electronically signed by Kurt Talavera MD on 2024 at  4:06 PM   Comment  [email or web address]   While definitive vascular necrosis is not present, the presence of innumerable perivascular neutrophils accompanied by eosinophils and interstitial erythrocyte extravasation is consistent with the clinical impression of cutaneous small vessel vasculitis.  The deep vascular plexus is not available for review, though visualized penetrative vessels appear uninvolved.  Ongoing clinical correlation is recommended.         Current Outpatient Medications:     albuterol (PROAIR HFA/PROVENTIL HFA/VENTOLIN HFA) 108 (90 Base) MCG/ACT inhaler, INHALE 2 PUFFS INTO THE LUNGS EVERY 4 HOURS AS NEEDED FOR SHORTNESS OF BREATH OR WHEEZING, Disp: 8.5 g, Rfl: 5    fluocinonide (LIDEX) 0.05 % external cream, Apply topically 2 times daily To affected areas of skin until cleared, Disp: 60 g, Rfl: 1    fluticasone-salmeterol (ADVAIR) 250-50 MCG/ACT inhaler, Inhale 1 puff into the lungs 2 times daily, Disp: 60 each, Rfl: 11    furosemide (LASIX) 20 MG tablet, Take 1 tablet (20 mg) by mouth 2 times daily, Disp: 180 tablet, Rfl: 3    lisinopril (ZESTRIL) 20 MG tablet, Take 1 tablet (20 mg) by mouth daily, Disp: 90 tablet, Rfl: 3    montelukast (SINGULAIR) 10 MG  "tablet, Take 1 tablet (10 mg) by mouth At Bedtime, Disp: 90 tablet, Rfl: 3    mycophenolate (GENERIC EQUIVALENT) 500 MG tablet, Take 1 tablet (500 mg) by mouth 2 times daily, Disp: 90 tablet, Rfl: 0    traZODone (DESYREL) 50 MG tablet, Take 1-2 tablets ( mg) by mouth At Bedtime, Disp: 60 tablet, Rfl: 3  Allergies:  Allergies   Allergen Reactions    No Known Drug Allergy     Pollen Extract      Medical Hx:  Past Medical History:   Diagnosis Date    Mild intermittent asthma     Nephrolithiasis      Surgical Hx:  Past Surgical History:   Procedure Laterality Date    ENT SURGERY      Nasal Fracture    NO HISTORY OF SURGERY       Objective   Physical Exam   /84   Pulse 101   Ht 1.753 m (5' 9\")   Wt 142.2 kg (313 lb 9.6 oz)   SpO2 94%   BMI 46.31 kg/m    General: alert, well appearing, no distress, obese   HEENT: no alopecia, clear conjunctiva,   Cardiac: normal rate and rhythm, no murmur, rubs or gallops   Pulm: normal respiratory effort, clear to auscultation bilaterally   MSK: Hand, wrist, elbow, and shoulder joints without evidence of synovitis or effusion. Intact and nonpainful range of motion.   Skin:   LE no longer with any erythematous macules and papules,  +hyperpigmented and indurated skin over bilateral lower extremities with improved edema, now only at level of the ankle and not level of th shin, no ulcerations, all healed                   Mariah Patrick MD  Rheumatology     "

## 2024-08-16 ENCOUNTER — OFFICE VISIT (OUTPATIENT)
Dept: RHEUMATOLOGY | Facility: CLINIC | Age: 44
End: 2024-08-16
Attending: STUDENT IN AN ORGANIZED HEALTH CARE EDUCATION/TRAINING PROGRAM
Payer: COMMERCIAL

## 2024-08-16 VITALS
DIASTOLIC BLOOD PRESSURE: 84 MMHG | HEIGHT: 69 IN | WEIGHT: 313.6 LBS | SYSTOLIC BLOOD PRESSURE: 132 MMHG | HEART RATE: 101 BPM | OXYGEN SATURATION: 94 % | BODY MASS INDEX: 46.45 KG/M2

## 2024-08-16 DIAGNOSIS — M31.8 SYSTEMIC VASCULITIS (H): Primary | ICD-10-CM

## 2024-08-16 PROCEDURE — 99214 OFFICE O/P EST MOD 30 MIN: CPT | Performed by: STUDENT IN AN ORGANIZED HEALTH CARE EDUCATION/TRAINING PROGRAM

## 2024-08-16 PROCEDURE — 99213 OFFICE O/P EST LOW 20 MIN: CPT | Performed by: STUDENT IN AN ORGANIZED HEALTH CARE EDUCATION/TRAINING PROGRAM

## 2024-08-16 PROCEDURE — G2211 COMPLEX E/M VISIT ADD ON: HCPCS | Performed by: STUDENT IN AN ORGANIZED HEALTH CARE EDUCATION/TRAINING PROGRAM

## 2024-08-16 RX ORDER — MYCOPHENOLATE MOFETIL 500 MG/1
500 TABLET ORAL 2 TIMES DAILY
Qty: 90 TABLET | Refills: 0 | Status: SHIPPED | OUTPATIENT
Start: 2024-08-16

## 2024-08-16 ASSESSMENT — PAIN SCALES - GENERAL: PAINLEVEL: MODERATE PAIN (4)

## 2024-08-16 NOTE — PATIENT INSTRUCTIONS
Stop Methotrexate and folic acid  Get lab work today : CBC, Cr, AST, ALT, esr and crp   Start Mycophenolate 500mg twice a day and repeat lab work in 2 weeks (CBC, Cr, AST, ALT, esr and crp)   I'll see you again in 2-3 months   Please schedule your CT scan   Please make an eye doctor appointment

## 2024-08-16 NOTE — LETTER
8/16/2024       RE: Mario Delgado  [address] [facility] [address]     Dear Colleague,    Thank you for referring your patient, Mario Delgado, to the [facility] RHEUMATOLOGY at [facility]. Please see a copy of my visit note below.    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  He denies any history or current fevers, weight loss, abdominal pain, sinusitis, hearing loss, external ear or nose cartilage inflammation.    He has been noted to have hematuria and proteinuria on UA and was seen by Dr. West in nephrology 7/2024. No current plan for renal biopsy, with plan to focus on edema and proteinuria management and recommendation to consider alternative choice to methotrexate given possible renal toxicity with methotrexate.     His presentation of inflammatory arthritis  and recurrent cutaneous vasculitis with necrosis, hematuria and proteinuria, raise concern for systemic involvement.  Will obtain CT abdomen to look for mesenteric vessel involvement which can support the diagnosis of PAN.  Workup for ANCA vasculitis has been negative with negative MPO and NM-3.  RF and cryoglobulins are negative. Complements normal/high.       No signs or symptoms to suggest a vasculitis associated with an underlying systemic autoimmune disease such as lupus or Sjogren's and with negative SUZIE, double-stranded DNA, Norman, SSA, SSB, and RNP.  Vasculitis associated with underlying infection seems less likely given that he is overall appears well and has had a negative HIV and hep C and hep B  "serologies.  No red flag symptoms for underlying malignancy.      Plan:   -Stop Methotrexate and folic acid  -Get lab work today : CBC, Cr, AST, ALT, esr and crp   -Start Mycophenolate 500mg twice a day and repeat lab work in 2 weeks (CBC, Cr, AST, ALT, esr and crp), will titrate up to 1,000mg BID   -Counseled pt to schedule CTA abdomen and pelvis  -Counseled pt to schedule Ophthalmology appointment for complete eye exam to assess for vasculitic involvement      High risk medication use  Comment: MMF  hepatitis C negative 2/2023  Quant gold neg 7/2024   Hep B neg 7/2024  Plan:  -Discussed common side effects of MMF including  GI upset.  Discussed need for periodic lab operatory monitoring for kidney, liver, and blood counts.    -Toxicity monitoring labs 2 weeks after initiation and dosage changes and otherwise every 3 months  -MTM following         Orders Placed This Encounter   Procedures     Erythrocyte sedimentation rate auto     CRP inflammation        The longitudinal plan of care for the diagnosis(es)/condition(s) as documented were addressed during this visit. Due to the added complexity in care, I will continue to support Jose in the subsequent management and with ongoing continuity of care.    RTC in 2-3 months     30 minutes spent on the day of the encounter doing chart review, history and exam, counseling and documentation.     Subjective    Reports that since starting the Methotrexate (has been on this for 3 weeks now) has had decrease in LE swelling and leg pain. (Improvement of swelling likely due to furosemide start). Has had more energy. Rash has improved and no new lesions. Continues to deny abdominal pain, fevers, respiratory issues, or sinus issues.   Had two cortisone shots in his knees  Saw Dr. West and per note, \"The overall constellation of finding is consistent and very suggestive of IgA Vasculitis.  A kidney biopsy would be required for confirmation, however this would be technically " "challenging given his high BMI and body habitus.  At this point, I will focus on managing the edema and proteinuria.  I concur with Dr Edmondson plan to use a steroid-sparing agent.  Although the GFR is normal, I would favor an alternative choice to methotrexate.. \" He was also started on lisinopril and furosemide.       HPI    Referred by Dr. 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    In Jan developed a rash over his legs over the course of a week which was very itchy. Later developed joint pain and swelling over his knees, elbows, fingers. Couldn't make a fist due to swelling in his knuckles. Felt the pain migrated around his joints. No fevers, no abdominal pain, no diarrhea or blood in stool. Initially was put on some antibiotic which he reports made the rash worse.  Was started on prednisone 40mg in early February with taper- down by 5mg every 4 days. Reports this helped his joint pain which resolved after a few weeks and the rash slowly improved over time but continued to have \"flares\" of the rash that would start on his legs and spread up his arms and then self resolved after 3 to 4 days.  He continues to have scabs over his legs that he feels are not healing well.  He will intermittently take prednisone for the rash but does not like to take this due to the weight gain. Last took prednisone 2-3 weeks ago, 10mg as noticed his skin flaring. Feels it has flared 5-6 times since January.  Denies any new medications around the time of his initial symptom onset.  We reviewed a list of common medications that cause vasculitis today and he confirms and these were started.      Has a history of nephrolithiasis about a decade ago.   History of shingles around his waist 15 years ago     No raynaud's, no cartilage swelling, no recurrent sinus infections, no hearing loss,   no oral or nasal ulcers, no hx of inflammatory eye " disease, no other rashes,  +crusty eyes in the morning and increased tearing, no dry eyes or dry mouth,  no dyspnea, no cough, no chest pain, no fevers or weigh loss, no hx of blood clots,  no numbness or tingling, no muscle pain.    Grandfather with possible RA, otherwise no family history of autoimmune disease   Works at a [employer] plant. 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   Not a previous smoker. Not a current smoker.       Lab and Imaging review:    I reviewed recent labs and imaging includin/2024  Neg/normal cryoglobulins, RF, C4  High C3   Crp 17   MPO and PR3 neg    2024 proteinuria and hematuria   Normal CMP  Neg/normal SSA, SSB, Norman, RNP, streptolysin O, MPO, PR3, SUZIE, dsDNA,   Hep C non-reactive, HIV neg      2024 Dermatopathology     Final Diagnosis   A(1). Abdomen:  - Consistent with evolving leukocytoclastic vasculitis - (see comment and description)    - Nonspecific but supportive immunofluorescence study, without evidence of IgA vasculitis - (see description)   Electronically signed by Kurt Talavera MD on 2024 at  4:06 PM   Comment  [id number]   While definitive vascular necrosis is not present, the presence of innumerable perivascular neutrophils accompanied by eosinophils and interstitial erythrocyte extravasation is consistent with the clinical impression of cutaneous small vessel vasculitis.  The deep vascular plexus is not available for review, though visualized penetrative vessels appear uninvolved.  Ongoing clinical correlation is recommended.         Current Outpatient Medications:      albuterol (PROAIR HFA/PROVENTIL HFA/VENTOLIN HFA) 108 (90 Base) MCG/ACT inhaler, INHALE 2 PUFFS INTO THE LUNGS EVERY 4 HOURS AS NEEDED FOR SHORTNESS OF BREATH OR WHEEZING, Disp: 8.5 g, Rfl: 5     fluocinonide (LIDEX) 0.05 % external cream, Apply topically 2 times daily To affected areas of skin until cleared, Disp: 60 g, Rfl: 1     fluticasone-salmeterol (ADVAIR) 250-50 MCG/ACT inhaler, Inhale 1  "puff into the lungs 2 times daily, Disp: 60 each, Rfl: 11     furosemide (LASIX) 20 MG tablet, Take 1 tablet (20 mg) by mouth 2 times daily, Disp: 180 tablet, Rfl: 3     lisinopril (ZESTRIL) 20 MG tablet, Take 1 tablet (20 mg) by mouth daily, Disp: 90 tablet, Rfl: 3     montelukast (SINGULAIR) 10 MG tablet, Take 1 tablet (10 mg) by mouth At Bedtime, Disp: 90 tablet, Rfl: 3     mycophenolate (GENERIC EQUIVALENT) 500 MG tablet, Take 1 tablet (500 mg) by mouth 2 times daily, Disp: 90 tablet, Rfl: 0     traZODone (DESYREL) 50 MG tablet, Take 1-2 tablets ( mg) by mouth At Bedtime, Disp: 60 tablet, Rfl: 3  Allergies:  Allergies   Allergen Reactions     No Known Drug Allergy      Pollen Extract      Medical Hx:  Past Medical History:   Diagnosis Date     Mild intermittent asthma      Nephrolithiasis      Surgical Hx:  Past Surgical History:   Procedure Laterality Date     ENT SURGERY      Nasal Fracture     NO HISTORY OF SURGERY       Objective  Physical Exam   /84   Pulse 101   Ht 1.753 m (5' 9\")   Wt 142.2 kg (313 lb 9.6 oz)   SpO2 94%   BMI 46.31 kg/m    General: alert, well appearing, no distress, obese   HEENT: no alopecia, clear conjunctiva,   Cardiac: normal rate and rhythm, no murmur, rubs or gallops   Pulm: normal respiratory effort, clear to auscultation bilaterally   MSK: Hand, wrist, elbow, and shoulder joints without evidence of synovitis or effusion. Intact and nonpainful range of motion.   Skin:   LE no longer with any erythematous macules and papules,  +hyperpigmented and indurated skin over bilateral lower extremities with improved edema, now only at level of the ankle and not level of th shin, no ulcerations, all healed                   Mariah Patrick MD  Rheumatology       Again, thank you for allowing me to participate in the care of your patient.      Sincerely,    Mariah Patrick MD    "

## 2024-08-16 NOTE — NURSING NOTE
"Chief Complaint   Patient presents with    RECHECK     Follow Up     /84   Pulse 101   Ht 1.753 m (5' 9\")   Wt 142.2 kg (313 lb 9.6 oz)   SpO2 94%   BMI 46.31 kg/m    Charissa Hampton on 8/16/2024 at 3:49 PM    "

## 2024-08-17 ENCOUNTER — LAB (OUTPATIENT)
Dept: LAB | Facility: CLINIC | Age: 44
End: 2024-08-17
Payer: COMMERCIAL

## 2024-08-17 DIAGNOSIS — M31.8 SYSTEMIC VASCULITIS (H): ICD-10-CM

## 2024-08-17 DIAGNOSIS — M31.0 LEUKOCYTOCLASTIC VASCULITIS (H): ICD-10-CM

## 2024-08-17 DIAGNOSIS — I77.6 VASCULITIS (H): ICD-10-CM

## 2024-08-17 LAB
BASOPHILS # BLD AUTO: 0 10E3/UL (ref 0–0.2)
BASOPHILS NFR BLD AUTO: 0 %
EOSINOPHIL # BLD AUTO: 0.1 10E3/UL (ref 0–0.7)
EOSINOPHIL NFR BLD AUTO: 1 %
ERYTHROCYTE [DISTWIDTH] IN BLOOD BY AUTOMATED COUNT: 13.4 % (ref 10–15)
ERYTHROCYTE [SEDIMENTATION RATE] IN BLOOD BY WESTERGREN METHOD: 6 MM/HR (ref 0–15)
HCT VFR BLD AUTO: 46.6 % (ref 40–53)
HGB BLD-MCNC: 15.1 G/DL (ref 13.3–17.7)
IMM GRANULOCYTES # BLD: 0 10E3/UL
IMM GRANULOCYTES NFR BLD: 0 %
LYMPHOCYTES # BLD AUTO: 2.1 10E3/UL (ref 0.8–5.3)
LYMPHOCYTES NFR BLD AUTO: 20 %
MCH RBC QN AUTO: 30.1 PG (ref 26.5–33)
MCHC RBC AUTO-ENTMCNC: 32.4 G/DL (ref 31.5–36.5)
MCV RBC AUTO: 93 FL (ref 78–100)
MONOCYTES # BLD AUTO: 0.9 10E3/UL (ref 0–1.3)
MONOCYTES NFR BLD AUTO: 9 %
NEUTROPHILS # BLD AUTO: 7.1 10E3/UL (ref 1.6–8.3)
NEUTROPHILS NFR BLD AUTO: 70 %
PLATELET # BLD AUTO: 290 10E3/UL (ref 150–450)
RBC # BLD AUTO: 5.01 10E6/UL (ref 4.4–5.9)
WBC # BLD AUTO: 10.1 10E3/UL (ref 4–11)

## 2024-08-17 PROCEDURE — 85025 COMPLETE CBC W/AUTO DIFF WBC: CPT

## 2024-08-17 PROCEDURE — 85652 RBC SED RATE AUTOMATED: CPT

## 2024-08-17 PROCEDURE — 82784 ASSAY IGA/IGD/IGG/IGM EACH: CPT

## 2024-08-17 PROCEDURE — 36415 COLL VENOUS BLD VENIPUNCTURE: CPT

## 2024-08-17 PROCEDURE — 86140 C-REACTIVE PROTEIN: CPT

## 2024-08-17 PROCEDURE — 80069 RENAL FUNCTION PANEL: CPT

## 2024-08-17 PROCEDURE — 84460 ALANINE AMINO (ALT) (SGPT): CPT

## 2024-08-17 PROCEDURE — 82565 ASSAY OF CREATININE: CPT

## 2024-08-17 PROCEDURE — 86160 COMPLEMENT ANTIGEN: CPT | Performed by: PEDIATRICS

## 2024-08-17 PROCEDURE — 86335 IMMUNFIX E-PHORSIS/URINE/CSF: CPT | Performed by: PEDIATRICS

## 2024-08-17 PROCEDURE — 84450 TRANSFERASE (AST) (SGOT): CPT

## 2024-08-18 LAB
ALT SERPL W P-5'-P-CCNC: 45 U/L (ref 0–70)
AST SERPL W P-5'-P-CCNC: 21 U/L (ref 0–45)
CREAT SERPL-MCNC: 0.96 MG/DL (ref 0.67–1.17)
CRP SERPL-MCNC: 7.13 MG/L
EGFRCR SERPLBLD CKD-EPI 2021: >90 ML/MIN/1.73M2

## 2024-08-19 LAB
C3 SERPL-MCNC: 143 MG/DL (ref 81–157)
IGA SERPL-MCNC: 202 MG/DL (ref 84–499)
PROT ELPH PNL UR ELPH: NORMAL

## 2024-08-21 ENCOUNTER — TELEPHONE (OUTPATIENT)
Dept: NEPHROLOGY | Facility: CLINIC | Age: 44
End: 2024-08-21
Payer: COMMERCIAL

## 2024-08-21 DIAGNOSIS — M31.0 LEUKOCYTOCLASTIC VASCULITIS (H): Primary | ICD-10-CM

## 2024-08-21 LAB
ALBUMIN SERPL BCG-MCNC: 4.2 G/DL (ref 3.5–5.2)
ANION GAP SERPL CALCULATED.3IONS-SCNC: 13 MMOL/L (ref 7–15)
BUN SERPL-MCNC: 15.8 MG/DL (ref 6–20)
CALCIUM SERPL-MCNC: 9.7 MG/DL (ref 8.8–10.4)
CHLORIDE SERPL-SCNC: 101 MMOL/L (ref 98–107)
CREAT SERPL-MCNC: 1.01 MG/DL (ref 0.67–1.17)
EGFRCR SERPLBLD CKD-EPI 2021: >90 ML/MIN/1.73M2
GLUCOSE SERPL-MCNC: 67 MG/DL (ref 70–99)
HCO3 SERPL-SCNC: 23 MMOL/L (ref 22–29)
PHOSPHATE SERPL-MCNC: 3.5 MG/DL (ref 2.5–4.5)
POTASSIUM SERPL-SCNC: >10 MMOL/L (ref 3.4–5.3)
SODIUM SERPL-SCNC: 137 MMOL/L (ref 135–145)

## 2024-08-21 NOTE — TELEPHONE ENCOUNTER
Jose calling to clarify his appts.  He was thinking his visit is Friday and video visit with Dr. West.  Updated patient to scheduled appts which patient is not able to make.  Plan: Follow up with provider on next steps.  Updated Dr. West, reviewing that he was just seen by Dr. Patrick who is switching him off Methotrexate and putting patient on MMF instead.  Reviewed labs with him and ok for patient to wait to Oct next available appt.missed Kidney/Urine labs ordered to review results.  Returned call to patient who agrees with plan of care.  He will go to [facility] to drop a urine sample and then plan of seeing Dr. West on 10/10 with labs earlier in the week at [facility].  Tomorrow appt cancelled per patient request.  Other appts all scheduled after confirming date and times with patient.    Yoly Gruber RN, BSN, PHN  [facility]cul[facility] Nephrology Nurse Navigator  [phone]

## 2024-08-22 ENCOUNTER — LAB (OUTPATIENT)
Dept: LAB | Facility: CLINIC | Age: 44
End: 2024-08-22
Payer: COMMERCIAL

## 2024-08-22 ENCOUNTER — TELEPHONE (OUTPATIENT)
Dept: NEPHROLOGY | Facility: CLINIC | Age: 44
End: 2024-08-22

## 2024-08-22 DIAGNOSIS — E87.5 HYPERKALEMIA: ICD-10-CM

## 2024-08-22 DIAGNOSIS — E87.5 HYPERKALEMIA: Primary | ICD-10-CM

## 2024-08-22 LAB
ALBUMIN SERPL BCG-MCNC: 4.3 G/DL (ref 3.5–5.2)
ANION GAP SERPL CALCULATED.3IONS-SCNC: 11 MMOL/L (ref 7–15)
BUN SERPL-MCNC: 14.3 MG/DL (ref 6–20)
CALCIUM SERPL-MCNC: 9.6 MG/DL (ref 8.8–10.4)
CHLORIDE SERPL-SCNC: 99 MMOL/L (ref 98–107)
CREAT SERPL-MCNC: 1.11 MG/DL (ref 0.67–1.17)
EGFRCR SERPLBLD CKD-EPI 2021: 83 ML/MIN/1.73M2
GLUCOSE SERPL-MCNC: 94 MG/DL (ref 70–99)
HCO3 SERPL-SCNC: 27 MMOL/L (ref 22–29)
PHOSPHATE SERPL-MCNC: 3.3 MG/DL (ref 2.5–4.5)
POTASSIUM SERPL-SCNC: 4.3 MMOL/L (ref 3.4–5.3)
SODIUM SERPL-SCNC: 137 MMOL/L (ref 135–145)

## 2024-08-22 PROCEDURE — 36415 COLL VENOUS BLD VENIPUNCTURE: CPT

## 2024-08-22 PROCEDURE — 82310 ASSAY OF CALCIUM: CPT

## 2024-08-22 NOTE — TELEPHONE ENCOUNTER
Update from Dr. Lima of critical K of 10.0 from add on 17th.  Would like follow up with patient and redraw of Renal panel.  Voicemail left for patient to go to lab today and get Renal panel redrawn.  Updated Dr. Lima of voicemail and ongoing follow up.  ER contact, mom left voicemail requesting patient to return call.  Yoly Gruber RN, BSN, PHN  Vasculitis [facility] Lupus Program Nephrology Nurse Navigator  [phone]

## 2024-08-26 ENCOUNTER — TELEPHONE (OUTPATIENT)
Dept: NEPHROLOGY | Facility: CLINIC | Age: 44
End: 2024-08-26
Payer: COMMERCIAL

## 2024-08-26 NOTE — TELEPHONE ENCOUNTER
Reached out to patient to get him scheduled for Vasculitis follow up.  Reserved Dr. Martinez appt for Wednesday as per patient request to change provider.  Patient to return call by 4pm today if he can make it.  MyChart sent to clarify appt is at [facility] not [facility] as initially thought but can be switched to [place] for ongoing follow up.    Voicemail received after hours from patient he is not able to make tomorrow reserved slot.     Yoly Gruber RN, BSN, PHN  Vasculitis & Lupus Program Nephrology Nurse Navigator  [phone]

## 2024-08-27 NOTE — TELEPHONE ENCOUNTER
Voicemail left that appt for tomorrow is open to be filled and to return call when he has time to get rescheduled at our [place] location.  GN Contact information left for timely support.  Yoly Gruber RN, BSN, PHN  GN Care Coordinator  [phone]

## 2024-09-03 ENCOUNTER — OFFICE VISIT (OUTPATIENT)
Dept: FAMILY MEDICINE | Facility: CLINIC | Age: 44
End: 2024-09-03
Payer: COMMERCIAL

## 2024-09-03 ENCOUNTER — TELEPHONE (OUTPATIENT)
Dept: FAMILY MEDICINE | Facility: CLINIC | Age: 44
End: 2024-09-03

## 2024-09-03 ENCOUNTER — ORDERS ONLY (AUTO-RELEASED) (OUTPATIENT)
Dept: FAMILY MEDICINE | Facility: CLINIC | Age: 44
End: 2024-09-03

## 2024-09-03 VITALS
SYSTOLIC BLOOD PRESSURE: 135 MMHG | OXYGEN SATURATION: 97 % | HEART RATE: 84 BPM | WEIGHT: 307 LBS | TEMPERATURE: 98.6 F | HEIGHT: 69 IN | RESPIRATION RATE: 20 BRPM | DIASTOLIC BLOOD PRESSURE: 97 MMHG | BODY MASS INDEX: 45.47 KG/M2

## 2024-09-03 DIAGNOSIS — E66.01 MORBID OBESITY (H): ICD-10-CM

## 2024-09-03 DIAGNOSIS — J45.40 MODERATE PERSISTENT ASTHMA WITHOUT COMPLICATION: ICD-10-CM

## 2024-09-03 DIAGNOSIS — Z12.11 SCREEN FOR COLON CANCER: ICD-10-CM

## 2024-09-03 DIAGNOSIS — L95.9 CUTANEOUS VASCULITIS: ICD-10-CM

## 2024-09-03 DIAGNOSIS — I10 PRIMARY HYPERTENSION: ICD-10-CM

## 2024-09-03 DIAGNOSIS — N18.1 CKD (CHRONIC KIDNEY DISEASE) STAGE 1, GFR 90 ML/MIN OR GREATER: ICD-10-CM

## 2024-09-03 DIAGNOSIS — Z00.00 ROUTINE GENERAL MEDICAL EXAMINATION AT A HEALTH CARE FACILITY: Primary | ICD-10-CM

## 2024-09-03 PROCEDURE — 99396 PREV VISIT EST AGE 40-64: CPT | Performed by: FAMILY MEDICINE

## 2024-09-03 PROCEDURE — 99214 OFFICE O/P EST MOD 30 MIN: CPT | Mod: 25 | Performed by: FAMILY MEDICINE

## 2024-09-03 RX ORDER — ALBUTEROL SULFATE 90 UG/1
2 AEROSOL, METERED RESPIRATORY (INHALATION) EVERY 4 HOURS PRN
Qty: 8.5 G | Refills: 1 | Status: SHIPPED | OUTPATIENT
Start: 2024-09-03

## 2024-09-03 RX ORDER — FLUTICASONE PROPIONATE AND SALMETEROL 250; 50 UG/1; UG/1
1 POWDER RESPIRATORY (INHALATION) 2 TIMES DAILY
Qty: 3 EACH | Refills: 3 | Status: SHIPPED | OUTPATIENT
Start: 2024-09-03

## 2024-09-03 ASSESSMENT — ASTHMA QUESTIONNAIRES
QUESTION_5 LAST FOUR WEEKS HOW WOULD YOU RATE YOUR ASTHMA CONTROL: SOMEWHAT CONTROLLED
QUESTION_1 LAST FOUR WEEKS HOW MUCH OF THE TIME DID YOUR ASTHMA KEEP YOU FROM GETTING AS MUCH DONE AT WORK, SCHOOL OR AT HOME: A LITTLE OF THE TIME
QUESTION_3 LAST FOUR WEEKS HOW OFTEN DID YOUR ASTHMA SYMPTOMS (WHEEZING, COUGHING, SHORTNESS OF BREATH, CHEST TIGHTNESS OR PAIN) WAKE YOU UP AT NIGHT OR EARLIER THAN USUAL IN THE MORNING: TWO OR THREE NIGHTS A WEEK
QUESTION_4 LAST FOUR WEEKS HOW OFTEN HAVE YOU USED YOUR RESCUE INHALER OR NEBULIZER MEDICATION (SUCH AS ALBUTEROL): ONE OR TWO TIMES PER DAY
ACT_TOTALSCORE: 13
ACT_TOTALSCORE: 13
QUESTION_2 LAST FOUR WEEKS HOW OFTEN HAVE YOU HAD SHORTNESS OF BREATH: ONCE A DAY

## 2024-09-03 NOTE — PROGRESS NOTES
Preventive Care Visit  [facility]  Ever Cardoso MD, Family Medicine  Sep 3, 2024      Assessment & Plan   Problem List Items Addressed This Visit       Moderate persistent asthma     Diagnosed at age 10, he uses advair discus twice daily and albuterol as needed, although pt is using his advair once daily only, plan:  Increase the use to twice daily, and follow up with sleep study as he gets symptoms mainly at night time.           Relevant Medications    albuterol (PROAIR HFA/PROVENTIL HFA/VENTOLIN HFA) 108 (90 Base) MCG/ACT inhaler    fluticasone-salmeterol (ADVAIR) 250-50 MCG/ACT inhaler    Morbid obesity (H)     Pt has been trying to loose weight, especially after he was on prednisone for a long time, pt is interested in medications, will refer to [facility] for weight loss medication management.   Also Today I counseled the patient about diet, regular exercise and weight loss planning.           Relevant Orders    Med Therapy Management Referral    Cutaneous vasculitis     Mostly IGA vasculitis, started on treatment with mycophenolate 500 mg twice daily, and following up with Rheumatology.         Relevant Medications    albuterol (PROAIR HFA/PROVENTIL HFA/VENTOLIN HFA) 108 (90 Base) MCG/ACT inhaler    fluticasone-salmeterol (ADVAIR) 250-50 MCG/ACT inhaler    Cutaneous vasculitis causing hematuria and proteinuria.     Thought to be related to cutaneous vasculitis (IGA most likely), and started on lisinopril 20 mg and Furosemide 20 mg daily, and he is following up with nephrology.         Primary hypertension     Seen By nephrology and started on Lisinopril 20 mg along with furosemide 20 mg, but BP is still elevated today, will recheck BP in 2 weeks in the office.            Other Visit Diagnoses       Routine general medical examination at a health care facility    -  Primary    Screen for colon cancer        Relevant Orders    REMY([employer])                    BMI  Estimated body  "mass index is 45.34 kg/m  as calculated from the following:    Height as of this encounter: 1.753 m (5' 9\").    Weight as of this encounter: 139.3 kg (307 lb).   Weight management plan: Discussed healthy diet and exercise guidelines    Counseling  Appropriate preventive services were addressed with this patient via screening, questionnaire, or discussion as appropriate for fall prevention, nutrition, physical activity, Tobacco-use cessation, social engagement, weight loss and cognition.  Checklist reviewing preventive services available has been given to the patient.  Reviewed patient's diet, addressing concerns and/or questions.   He is at risk for lack of exercise and has been provided with information to increase physical activity for the benefit of his well-being.   The patient was instructed to see the dentist every 6 months.   The patient reports drinking more than 3 alcoholic drinks per day and/or more than 7 drhnks per week. The patient was counseled and given information about possible harmful effects of excessive alcohol intake.        Kelly Garcia is a 44 year old, presenting for the following:  Physical and Asthma        9/3/2024    12:47 PM   Additional Questions   Roomed by Yuliya MENDIETA        Health Care Directive  Patient does not have a Health Care Directive or Living Will: Discussed advance care planning with patient; however, patient declined at this time.    HPI      Hypertension Follow-up    Do you check your blood pressure regularly outside of the clinic? No   Are you following a low salt diet? Yes  Are your blood pressures ever more than 140 on the top number (systolic) OR more   than 90 on the bottom number (diastolic), for example 140/90? Yes        9/3/2024   General Health   How would you rate your overall physical health? Good   Feel stress (tense, anxious, or unable to sleep) To some extent      (!) STRESS CONCERN      9/3/2024   Nutrition   Three or more servings of calcium each day? " (!) NO   Diet: Regular (no restrictions)   How many servings of fruit and vegetables per day? (!) 0-1   How many sweetened beverages each day? (!) 2            9/3/2024   Exercise   Days per week of moderate/strenous exercise 3 days   Average minutes spent exercising at this level 30 min            9/3/2024   Social Factors   Frequency of gathering with friends or relatives Twice a week   Worry food won't last until get money to buy more No   Food not last or not have enough money for food? No   Do you have housing? (Housing is defined as stable permanent housing and does not include staying ouside in a car, in a tent, in an abandoned building, in an overnight shelter, or couch-surfing.) Yes   Are you worried about losing your housing? No   Lack of transportation? No   Unable to get utilities (heat,electricity)? No            9/3/2024   Dental   Dentist two times every year? (!) NO                     9/3/2024   Substance Use   Alcohol more than 3/day or more than 7/wk Yes   How often do you have a drink containing alcohol 2 to 4 times a month   How many alcohol drinks on typical day 5 or 6   How often do you have 5+ drinks at one occasion Less than monthly   Audit 2/3 Score 3   How often not able to stop drinking once started Never   How often failed to do what normally expected Never   How often needed first drink in am after a heavy drinking session Never   How often feeling of guilt or remorse after drinking Never   How often unable to remember what happened the night before Never   Have you or someone else been injured because of your drinking No   Has anyone been concerned or suggested you cut down on drinking No   TOTAL SCORE - AUDIT 5   Do you use any other substances recreationally? (!) CANNABIS PRODUCTS        Social History     Tobacco Use    Smoking status: Never    Smokeless tobacco: Former     Types: Chew     Quit date: 1/1/2016   Vaping Use    Vaping status: Never Used   Substance Use Topics     "Alcohol use: Yes     Alcohol/week: 4.0 - 5.0 standard drinks of alcohol     Types: 4 - 5 Standard drinks or equivalent per week    Drug use: No           9/3/2024   STI Screening   New sexual partner(s) since last STI/HIV test? No      ASCVD Risk   The 10-year ASCVD risk score (Saravanan HEDRICK, et al., 2019) is: 3.4%    Values used to calculate the score:      Age: 44 years      Sex: Male      Is Non- : No      Diabetic: No      Tobacco smoker: No      Systolic Blood Pressure: 152 mmHg      Is BP treated: Yes      HDL Cholesterol: 43 mg/dL      Total Cholesterol: 173 mg/dL        9/3/2024   Contraception/Family Planning   Questions about contraception or family planning No           Reviewed and updated as needed this visit by Provider                    Past Medical History:   Diagnosis Date    Hypertension     Mild intermittent asthma     Nephrolithiasis      Past Surgical History:   Procedure Laterality Date    ENT SURGERY      Nasal Fracture    NO HISTORY OF SURGERY           Review of Systems  Constitutional, HEENT, cardiovascular, pulmonary, GI, , musculoskeletal, neuro, skin, endocrine and psych systems are negative, except as otherwise noted.     Objective    Exam  BP (!) 152/88 (BP Location: Left arm, Patient Position: Chair, Cuff Size: Adult Large)   Pulse 84   Temp 98.6  F (37  C) (Oral)   Resp 20   Ht 1.753 m (5' 9\")   Wt 139.3 kg (307 lb)   SpO2 97%   BMI 45.34 kg/m     Estimated body mass index is 45.34 kg/m  as calculated from the following:    Height as of this encounter: 1.753 m (5' 9\").    Weight as of this encounter: 139.3 kg (307 lb).    Physical Exam  GENERAL: alert and no distress  EYES: Eyes grossly normal to inspection, PERRL and conjunctivae and sclerae normal  HENT: ear canals and TM's normal, nose and mouth without ulcers or lesions  NECK: no adenopathy, no asymmetry, masses, or scars  RESP: lungs clear to auscultation - no rales, rhonchi or wheezes  CV: " regular rate and rhythm, normal S1 S2, no S3 or S4, no murmur, click or rub, mild leg swelling.  ABDOMEN: soft, nontender, no hepatosplenomegaly, no masses and bowel sounds normal  MS: no gross musculoskeletal defects noted, mild edmea in the lower legs,   SKIN: mild hyperemia of the skin in the lower legs, along with some healing ulcers.   NEURO: Normal strength and tone, mentation intact and speech normal  PSYCH: mentation appears normal, affect normal/bright        Signed Electronically by: Ever Cardoso MD

## 2024-09-03 NOTE — ASSESSMENT & PLAN NOTE
Pt has been trying to loose weight, especially after he was on prednisone for a long time, pt is interested in medications, will refer to [facility]TM for weight loss medication management.   Also Today I counseled the patient about diet, regular exercise and weight loss planning.

## 2024-09-03 NOTE — ASSESSMENT & PLAN NOTE
Diagnosed at age 10, he uses advair discus twice daily and albuterol as needed, although pt is using his advair once daily only, plan:  Increase the use to twice daily, and follow up with sleep study as he gets symptoms mainly at night time.

## 2024-09-03 NOTE — PATIENT INSTRUCTIONS
Patient Education   Preventive Care Advice   This is general advice given by our system to help you stay healthy. However, your care team may have specific advice just for you. Please talk to your care team about your preventive care needs.  Nutrition  Eat 5 or more servings of fruits and vegetables each day.  Try wheat bread, brown rice and whole grain pasta (instead of white bread, rice, and pasta).  Get enough calcium and vitamin D. Check the label on foods and aim for 100% of the RDA (recommended daily allowance).  Lifestyle  Exercise at least 150 minutes each week  (30 minutes a day, 5 days a week).  Do muscle strengthening activities 2 days a week. These help control your weight and prevent disease.  No smoking.  Wear sunscreen to prevent skin cancer.  Have a dental exam and cleaning every 6 months.  Yearly exams  See your health care team every year to talk about:  Any changes in your health.  Any medicines your care team has prescribed.  Preventive care, family planning, and ways to prevent chronic diseases.  Shots (vaccines)   HPV shots (up to age 26), if you've never had them before.  Hepatitis B shots (up to age 59), if you've never had them before.  COVID-19 shot: Get this shot when it's due.  Flu shot: Get a flu shot every year.  Tetanus shot: Get a tetanus shot every 10 years.  Pneumococcal, hepatitis A, and RSV shots: Ask your care team if you need these based on your risk.  Shingles shot (for age 50 and up)  General health tests  Diabetes screening:  Starting at age 35, Get screened for diabetes at least every 3 years.  If you are younger than age 35, ask your care team if you should be screened for diabetes.  Cholesterol test: At age 39, start having a cholesterol test every 5 years, or more often if advised.  Bone density scan (DEXA): At age 50, ask your care team if you should have this scan for osteoporosis (brittle bones).  Hepatitis C: Get tested at least once in your life.  STIs (sexually  transmitted infections)  Before age 24: Ask your care team if you should be screened for STIs.  After age 24: Get screened for STIs if you're at risk. You are at risk for STIs (including HIV) if:  You are sexually active with more than one person.  You don't use condoms every time.  You or a partner was diagnosed with a sexually transmitted infection.  If you are at risk for HIV, ask about PrEP medicine to prevent HIV.  Get tested for HIV at least once in your life, whether you are at risk for HIV or not.  Cancer screening tests  Cervical cancer screening: If you have a cervix, begin getting regular cervical cancer screening tests starting at age 21.  Breast cancer scan (mammogram): If you've ever had breasts, begin having regular mammograms starting at age 40. This is a scan to check for breast cancer.  Colon cancer screening: It is important to start screening for colon cancer at age 45.  Have a colonoscopy test every 10 years (or more often if you're at risk) Or, ask your provider about stool tests like a FIT test every year or Cologuard test every 3 years.  To learn more about your testing options, visit:   .  For help making a decision, visit:   https://bit.ly/mq67919.  Prostate cancer screening test: If you have a prostate, ask your care team if a prostate cancer screening test (PSA) at age 55 is right for you.  Lung cancer screening: If you are a current or former smoker ages 50 to 80, ask your care team if ongoing lung cancer screenings are right for you.  For informational purposes only. Not to replace the advice of your health care provider. Copyright   2023 [facility]. All rights reserved. Clinically reviewed by the [facility]itions Program. Simple Tithe 212708 - REV 01/24.  9 Ways to Cut Back on Drinking  Maybe you've found yourself drinking more alcohol than you'd prefer. If you want to cut back, here are some ideas to try.    Think before you drink.  Do you really want a drink,  "or is it just a habit? If you're used to having a drink at a certain time, try doing something else then.     Look for substitutes.  Find some no-alcohol drinks that you enjoy, like flavored seltzer water, tea with honey, or tonic with a slice of lime. Or try alcohol-free beer or \"virgin\" cocktails (without the alcohol).     Drink more water.  Use water to quench your thirst. Drink a glass of water before you have any alcohol. Have another glass along with every drink or between drinks.     Shrink your drink.  For example, have a bottle of beer instead of a pint. Use a smaller glass for wine. Choose drinks with lower alcohol content (ABV%). Or use less liquor and more mixer in cocktails.     Slow down.  It's easy to drink quickly and without thinking about it. Pay attention, and make each drink last longer.     Do the math.  Total up how much you spend on alcohol each month. How much is that a year? If you cut back, what could you do with the money you save?     Take a break.  Choose a day or two each week when you won't drink at all. Notice how you feel on those days, physically and emotionally. How did you sleep? Do you feel better? Over time, add more break days.     Count calories.  Would you like to lose some weight? For some people that's a good motivator for cutting back. Figure out how many calories are in each drink. How many does that add up to in a day? In a week? In a month?     Practice saying no.  Be ready when someone offers you a drink. Try: \"Thanks, I've had enough.\" Or \"Thanks, but I'm cutting back.\" Or \"No, thanks. I feel better when I drink less.\"   Current as of: November 15, 2023  Content Version: 14.1 2006-2024 [employer].   Care instructions adapted under license by your healthcare professional. If you have questions about a medical condition or this instruction, always ask your healthcare professional. [employer] disclaims any warranty or liability for your use " of this information.  Substance Use Disorder: Care Instructions  Overview     You can improve your life and health by stopping your use of alcohol or drugs. When you don't drink or use drugs, you may feel and sleep better. You may get along better with your family, friends, and coworkers. There are medicines and programs that can help with substance use disorder.  How can you care for yourself at home?  Here are some ways to help you stay sober and prevent relapse.  If you have been given medicine to help keep you sober or reduce your cravings, be sure to take it exactly as prescribed.  Talk to your doctor about programs that can help you stop using drugs or drinking alcohol.  Do not keep alcohol or drugs in your home.  Plan ahead. Think about what you'll say if other people ask you to drink or use drugs. Try not to spend time with people who drink or use drugs.  Use the time and money spent on drinking or drugs to do something that's important to you.  Preventing a relapse  Have a plan to deal with relapse. Learn to recognize changes in your thinking that lead you to drink or use drugs. Get help before you start to drink or use drugs again.  Try to stay away from situations, friends, or places that may lead you to drink or use drugs.  If you feel the need to drink alcohol or use drugs again, seek help right away. Call a trusted friend or family member. Some people get support from organizations such as Narcotics Anonymous or [employer] or from treatment facilities.  If you relapse, get help as soon as you can. Some people make a plan with another person that outlines what they want that person to do for them if they relapse. The plan usually includes how to handle the relapse and who to notify in case of relapse.  Don't give up. Remember that a relapse doesn't mean that you have failed. Use the experience to learn the triggers that lead you to drink or use drugs. Then quit again. Recovery is a lifelong process.  "Many people have several relapses before they are able to quit for good.  Follow-up care is a key part of your treatment and safety. Be sure to make and go to all appointments, and call your doctor if you are having problems. It's also a good idea to know your test results and keep a list of the medicines you take.  When should you call for help?   Call 911  anytime you think you may need emergency care. For example, call if you or someone else:    Has overdosed or has withdrawal signs. Be sure to tell the emergency workers that you are or someone else is using or trying to quit using drugs. Overdose or withdrawal signs may include:  Losing consciousness.  Seizure.  Seeing or hearing things that aren't there (hallucinations).     Is thinking or talking about suicide or harming others.   Where to get help 24 hours a day, 7 days a week   If you or someone you know talks about suicide, self-harm, a mental health crisis, a substance use crisis, or any other kind of emotional distress, get help right away. You can:    Call the Suicide and Crisis Lifeline at 988.     Call 2-228-682-MWLC (1-806.410.1251).     Text HOME to 117059 to access the Crisis Text Line.   Consider saving these numbers in your phone.  Go to [email or web address] for more information or to chat online.  Call your doctor now or seek immediate medical care if:    You are having withdrawal symptoms. These may include nausea or vomiting, sweating, shakiness, and anxiety.   Watch closely for changes in your health, and be sure to contact your doctor if:    You have a relapse.     You need more help or support to stop.   Where can you learn more?  Go to https://www.[employer] [facility].net/patiented  Enter H573 in the search box to learn more about \"Substance Use Disorder: Care Instructions.\"  Current as of: November 15, 2023               Content Version: 14.0    0411-2415 [employer].   Care instructions adapted under license by your healthcare professional. " If you have questions about a medical condition or this instruction, always ask your healthcare professional. [employer] disclaims any warranty or liability for your use of this information.

## 2024-09-03 NOTE — TELEPHONE ENCOUNTER
BP was high in the office, so I wonder if we can do BP recheck in 2 weeks to see if it is still elevated. Or is it better.

## 2024-09-03 NOTE — ASSESSMENT & PLAN NOTE
Seen By nephrology and started on Lisinopril 20 mg along with furosemide 20 mg, but BP is still elevated today, will recheck BP in 2 weeks in the office.

## 2024-09-03 NOTE — ASSESSMENT & PLAN NOTE
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

## 2024-09-06 ENCOUNTER — VIRTUAL VISIT (OUTPATIENT)
Dept: PALLIATIVE MEDICINE | Facility: OTHER | Age: 44
End: 2024-09-06
Attending: STUDENT IN AN ORGANIZED HEALTH CARE EDUCATION/TRAINING PROGRAM
Payer: COMMERCIAL

## 2024-09-06 DIAGNOSIS — Z71.85 VACCINE COUNSELING: ICD-10-CM

## 2024-09-06 DIAGNOSIS — I77.6 VASCULITIS (H): Primary | ICD-10-CM

## 2024-09-06 NOTE — PROGRESS NOTES
Medication Therapy Management (MTM) Encounter    ASSESSMENT:                            Medication Adherence/Access: No issues identified    Vasculitis: Patient would benefit from continuing mycophenolate at current dose. He is due for lab monitoring and plans to have them checked next week. Pending lab results, recommend continuing plan from rheumatologist to titrate mycophenolate by 500 mg/day every 1-2 weeks with lab checks 1-2 weeks after each dose increase to target of 1000 mg twice daily.    Vaccines: Per ACIP guidelines, patient is eligible for Pneumococcal and Zoster      PLAN:                            Have labs ordered by Dr. Patrick checked at lab appointment as soon possible  Continue current medication regimen  Depending labs results, will plan to increase mycophenolate to 500 mg in the morning and 1000 mg in the evening.  Consider receiving the following vaccinations:  Prevnar 20 (pneumococcal)  Shingrix (shingles) - 2 dose series    Follow-up: Return in about 3 months (around 12/6/2024) for Follow up, with me, using a phone visit.    SUBJECTIVE/OBJECTIVE:                          Jose Delgado is a 44 year old male seen for a follow-up visit from 07/26/2024. He was referred to me from Mariah Patrick MD.       Reason for visit: Vasculitis follow up.    Allergies/ADRs: Reviewed in chart  Past Medical History: Reviewed in chart  Tobacco: He reports that he has never smoked. He quit smokeless tobacco use about 8 years ago.  His smokeless tobacco use included chew.  Alcohol: 4-5 beverages / week  Other Substance Use: THC 5-10 mg gummies as needed to help with pain and sleep at night  Social: 2 children: 11 and 9 year old    Medication Adherence/Access: no issues reported    Vasculitis:  Fluocinonide 0.05% cream once daily as needed   Mycophenolate 500 mg twice daily (started approximately 8/30/24)    Patient reports he is feeling better and has been able to be more active lately. He was  able to play golf without issue and ride bicycle as well. He is noticing a lot less swelling in his legs and states there is no rash. Reports he has lost 12 lbs since his last office visit with Dr. Patrick. He has pain in his knees, but thinks that from torn meniscus. He had one repaired on his right knee in the past and was told by his orthopedic provider that he may have hurt it again. States he also has troubles with left knee and will likely need repair surgery there as well. Receiving cortisone shots in both knees from his orthopedic provider and has found them to be helpful. Reports no side effects to the medications.    Previous therapies: methotrexate (potential renal toxicity)    Last lab monitoring completed: 8/17/2024    Vaccines:  Immunization History   Administered Date(s) Administered    TDAP (Adacel,Boostrix) 11/14/2022    TDAP Vaccine (Adacel) 10/18/2012     Today's Vitals: There were no vitals taken for this visit.  ----------------    I spent 13 minutes with this patient today. All changes were made via collaborative practice agreement with Mariah Patrcik. A copy of the visit note was provided to the patient's provider(s).    A summary of these recommendations was sent via [email or web address].    Marshal Wolfe, PharmD  Medication Therapy Management Pharmacist  [facility]  Phone: [phone]    Telemedicine Visit Details  Type of service:  Telephone visit  Start Time: 12:30 PM  End Time:  12:43 PM     Medication Therapy Recommendations  No medication therapy recommendations to display

## 2024-09-06 NOTE — PATIENT INSTRUCTIONS
"Recommendations from today's MTM visit:                                                       Have labs ordered by Dr. Patrick checked at lab appointment as soon possible  Continue current medication regimen  Depending labs results, will plan to increase mycophenolate to 500 mg in the morning and 1000 mg in the evening.  Consider receiving the following vaccinations:  Prevnar 20 (pneumococcal)  Shingrix (shingles) - 2 dose series    Follow-up: Return in about 3 months (around 12/6/2024) for Follow up, with me, using a phone visit.    It was great speaking with you today.  I value your experience and would be very thankful for your time in providing feedback in our clinic survey. In the next few days, you may receive an email or text message from [facility] with a link to a survey related to your  clinical pharmacist.\"     To schedule another MTM appointment, please call the clinic directly or you may call the MTM scheduling line at [phone] or toll-free at [phone].     My Clinical Pharmacist's contact information:                                                      Please feel free to contact me with any questions or concerns you have.      Marshal Wolfe, PharmD  Medication Therapy Management Pharmacist  [facility]  Phone: [phone]   "

## 2024-09-12 ENCOUNTER — LAB (OUTPATIENT)
Dept: LAB | Facility: CLINIC | Age: 44
End: 2024-09-12
Payer: COMMERCIAL

## 2024-09-12 DIAGNOSIS — M31.8 SYSTEMIC VASCULITIS (H): ICD-10-CM

## 2024-09-12 DIAGNOSIS — I77.6 VASCULITIS (H): ICD-10-CM

## 2024-09-12 LAB
BASOPHILS # BLD AUTO: 0 10E3/UL (ref 0–0.2)
BASOPHILS NFR BLD AUTO: 0 %
EOSINOPHIL # BLD AUTO: 0.1 10E3/UL (ref 0–0.7)
EOSINOPHIL NFR BLD AUTO: 1 %
ERYTHROCYTE [DISTWIDTH] IN BLOOD BY AUTOMATED COUNT: 13.6 % (ref 10–15)
ERYTHROCYTE [SEDIMENTATION RATE] IN BLOOD BY WESTERGREN METHOD: 7 MM/HR (ref 0–15)
HCT VFR BLD AUTO: 47.4 % (ref 40–53)
HGB BLD-MCNC: 15.3 G/DL (ref 13.3–17.7)
IMM GRANULOCYTES # BLD: 0 10E3/UL
IMM GRANULOCYTES NFR BLD: 0 %
LYMPHOCYTES # BLD AUTO: 1.9 10E3/UL (ref 0.8–5.3)
LYMPHOCYTES NFR BLD AUTO: 15 %
MCH RBC QN AUTO: 30.4 PG (ref 26.5–33)
MCHC RBC AUTO-ENTMCNC: 32.3 G/DL (ref 31.5–36.5)
MCV RBC AUTO: 94 FL (ref 78–100)
MONOCYTES # BLD AUTO: 0.8 10E3/UL (ref 0–1.3)
MONOCYTES NFR BLD AUTO: 6 %
NEUTROPHILS # BLD AUTO: 9.5 10E3/UL (ref 1.6–8.3)
NEUTROPHILS NFR BLD AUTO: 77 %
PLATELET # BLD AUTO: 301 10E3/UL (ref 150–450)
RBC # BLD AUTO: 5.04 10E6/UL (ref 4.4–5.9)
WBC # BLD AUTO: 12.3 10E3/UL (ref 4–11)

## 2024-09-12 PROCEDURE — 86140 C-REACTIVE PROTEIN: CPT

## 2024-09-12 PROCEDURE — 85025 COMPLETE CBC W/AUTO DIFF WBC: CPT

## 2024-09-12 PROCEDURE — 82565 ASSAY OF CREATININE: CPT

## 2024-09-12 PROCEDURE — 84450 TRANSFERASE (AST) (SGOT): CPT

## 2024-09-12 PROCEDURE — 84460 ALANINE AMINO (ALT) (SGPT): CPT

## 2024-09-12 PROCEDURE — 36415 COLL VENOUS BLD VENIPUNCTURE: CPT

## 2024-09-12 PROCEDURE — 85652 RBC SED RATE AUTOMATED: CPT

## 2024-09-13 LAB
ALT SERPL W P-5'-P-CCNC: 46 U/L (ref 0–70)
AST SERPL W P-5'-P-CCNC: 28 U/L (ref 0–45)
CREAT SERPL-MCNC: 1.29 MG/DL (ref 0.67–1.17)
CRP SERPL-MCNC: 7.34 MG/L
EGFRCR SERPLBLD CKD-EPI 2021: 69 ML/MIN/1.73M2

## 2024-09-19 ENCOUNTER — TELEPHONE (OUTPATIENT)
Dept: NEPHROLOGY | Facility: CLINIC | Age: 44
End: 2024-09-19
Payer: COMMERCIAL

## 2024-09-19 NOTE — TELEPHONE ENCOUNTER
Reached out to patient to see if he is available to come in today for cancelled appt.  Patient is not able.  Reviewed Dr. Patrick message on elevated Cr and need to come to upcoming appt to support kidney health.  Patient verbalized understanding.  Yoly Gruber RN, BSN, PHN  Vasculitis & Lupus Program Nephrology Nurse Navigator  [phone]

## 2024-10-30 NOTE — PROGRESS NOTES
Virtual Visit Details    Type of service:  Video Visit     Originating Location (pt. 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  He denies any history or current fevers, weight loss, abdominal pain, sinusitis, hearing loss, external ear or nose cartilage inflammation, or orchitis.    He has been noted to have hematuria and proteinuria on UA and was seen by Dr. West in nephrology 7/2024. No current plan for renal biopsy, with plan to focus on edema and proteinuria management and recommendation to consider Mycophenolate given possible renal toxicity with methotrexate.     His presentation of inflammatory arthritis  and recurrent cutaneous vasculitis with necrosis, hematuria and proteinuria, raise concern for systemic involvement.  Will obtain CTA abdomen to look for mesenteric vessel involvement which can support the diagnosis of PAN.  Workup for ANCA vasculitis has been negative with negative MPO and TX-3.  RF and cryoglobulins are negative. Complements normal/high.     No signs or symptoms to suggest a vasculitis associated with an underlying systemic autoimmune disease such as lupus or Sjogren's and with negative SUZIE, double-stranded DNA, Norman, SSA, SSB, and RNP.  Vasculitis associated with underlying infection seems less likely given that he is overall appears well and has had a negative HIV and hep C and hep B serologies.  No red flag symptoms for underlying malignancy.      Has not scheduled his follow-up appointment with   Janeen is overdue for urine studies.  Creatinine recently increased.    Plan:   -Increase mycophenolate to 1000 mg in the morning and 500 mg in the evening  -Discussed ways to increase compliance with evening dose including setting an alarm, put a medication near his toothbrush or bed stand.  -He is overdue for lab work, we will assist him in scheduling a lab appointment for tomorrow and asked him to obtain Dr. West's labs and urine studies as well   -His creatinine recently increased,  -Has still not scheduled his CTA abdomen and pelvis; provided him with the number to call to schedule this today  -Counseled pt to schedule Ophthalmology appointment for complete eye exam to assess for vasculitic involvement      High risk medication use  Comment: MMF  hepatitis C negative 2/2023  Quant gold neg 7/2024   Hep B neg 7/2024  Plan:  -Toxicity labs in 2 weeks as we are escalating his dose  -MTM following   -Vaccine counseling: Given the patient's underlying autoimmune condition I recommended:  -Annual seasonal influenza vaccine   -Pneumococcal vaccination (PCV 20)  -Shingles vaccination (shingrix)   -COVID vaccine series including latest booster       No orders of the defined types were placed in this encounter.       The longitudinal plan of care for the diagnosis(es)/condition(s) as documented were addressed during this visit. Due to the added complexity in care, I will continue to support Jose in the subsequent management and with ongoing continuity of care.    RTC in 3 months     30 minutes spent on the day of the encounter doing chart review, history and exam, counseling and documentation.     Subjective       A little over a month ago started seeing some new spots over his legs, but was wondering if it was due to increased alchol consumption at that time. He cut down on drinking and the spots went away and did not return.  Otherwise, no rashes over his legs and continues to have darker spots from the  "previous rash.  Forgets to take his second dose of MMF most days.  Reports he is a bit overwhelmed with all the labs and imaging studies that he is being asked to do by me and nephrology.  He continues to deny fevers, weight loss, abdominal pain, testicular swelling, sinus issues,        HPI    Referred by Dr. 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    In Jan developed a rash over his legs over the course of a week which was very itchy. Later developed joint pain and swelling over his knees, elbows, fingers. Couldn't make a fist due to swelling in his knuckles. Felt the pain migrated around his joints. No fevers, no abdominal pain, no diarrhea or blood in stool. Initially was put on some antibiotic which he reports made the rash worse.  Was started on prednisone 40mg in early February with taper- down by 5mg every 4 days. Reports this helped his joint pain which resolved after a few weeks and the rash slowly improved over time but continued to have \"flares\" of the rash that would start on his legs and spread up his arms and then self resolved after 3 to 4 days.  He continues to have scabs over his legs that he feels are not healing well.  He will intermittently take prednisone for the rash but does not like to take this due to the weight gain. Last took prednisone 2-3 weeks ago, 10mg as noticed his skin flaring. Feels it has flared 5-6 times since January.  Denies any new medications around the time of his initial symptom onset.  We reviewed a list of common medications that cause vasculitis today and he confirms and these were started.      Has a history of nephrolithiasis about a decade ago.   History of shingles around his waist 15 years ago     No raynaud's, no cartilage swelling, no recurrent sinus infections, no hearing loss,   no oral or nasal ulcers, no hx of inflammatory eye disease, no other rashes,  +crusty eyes in " the morning and increased tearing, no dry eyes or dry mouth,  no dyspnea, no cough, no chest pain, no fevers or weigh loss, no hx of blood clots,  no numbness or tingling, no muscle pain.    Grandfather with possible RA, otherwise no family history of autoimmune disease   Works at a [employer] plant. 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   Not a previous smoker. Not a current smoker.       Lab and Imaging review:    I reviewed recent labs and imaging includin/2024  Neg/normal cryoglobulins, RF, C4  High C3   Crp 17   MPO and PR3 neg    2024 proteinuria and hematuria   Normal CMP  Neg/normal SSA, SSB, Norman, RNP, streptolysin O, MPO, PR3, SUZIE, dsDNA,   Hep C non-reactive, HIV neg      2024 Dermatopathology     Final Diagnosis   A(1). Abdomen:  - Consistent with evolving leukocytoclastic vasculitis - (see comment and description)    - Nonspecific but supportive immunofluorescence study, without evidence of IgA vasculitis - (see description)   Electronically signed by Kurt Talavera MD on 2024 at  4:06 PM   Comment  [id number]   While definitive vascular necrosis is not present, the presence of innumerable perivascular neutrophils accompanied by eosinophils and interstitial erythrocyte extravasation is consistent with the clinical impression of cutaneous small vessel vasculitis.  The deep vascular plexus is not available for review, though visualized penetrative vessels appear uninvolved.  Ongoing clinical correlation is recommended.         Current Outpatient Medications:     albuterol (PROAIR HFA/PROVENTIL HFA/VENTOLIN HFA) 108 (90 Base) MCG/ACT inhaler, Inhale 2 puffs into the lungs every 4 hours as needed for shortness of breath or wheezing., Disp: 8.5 g, Rfl: 1    fluticasone-salmeterol (ADVAIR) 250-50 MCG/ACT inhaler, Inhale 1 puff into the lungs 2 times daily., Disp: 3 each, Rfl: 3    furosemide (LASIX) 20 MG tablet, Take 1 tablet (20 mg) by mouth 2 times daily, Disp: 180 tablet, Rfl: 3    lisinopril  (ZESTRIL) 20 MG tablet, Take 1 tablet (20 mg) by mouth daily, Disp: 90 tablet, Rfl: 3    montelukast (SINGULAIR) 10 MG tablet, Take 1 tablet (10 mg) by mouth At Bedtime, Disp: 90 tablet, Rfl: 3    mycophenolate (GENERIC EQUIVALENT) 500 MG tablet, Take 1 tablet (500 mg) by mouth 2 times daily, Disp: 90 tablet, Rfl: 0  Allergies:  Allergies   Allergen Reactions    No Known Drug Allergy     Pollen Extract      Medical Hx:  Past Medical History:   Diagnosis Date    Hypertension     Mild intermittent asthma     Nephrolithiasis      Surgical Hx:  Past Surgical History:   Procedure Laterality Date    ENT SURGERY      Nasal Fracture    NO HISTORY OF SURGERY       Objective   Physical Exam   There were no vitals taken for this visit.      Limited exam due to telehealth. Mentating appropriately. Breathing comfortably on room air. No visible rashes.         Pictures from exam on 8/16/24                  aMriah Patrick MD  Rheumatology

## 2024-10-31 ENCOUNTER — VIRTUAL VISIT (OUTPATIENT)
Dept: RHEUMATOLOGY | Facility: CLINIC | Age: 44
End: 2024-10-31
Payer: COMMERCIAL

## 2024-10-31 DIAGNOSIS — L95.9 CUTANEOUS VASCULITIS: Primary | ICD-10-CM

## 2024-10-31 PROCEDURE — G2211 COMPLEX E/M VISIT ADD ON: HCPCS | Mod: 95 | Performed by: STUDENT IN AN ORGANIZED HEALTH CARE EDUCATION/TRAINING PROGRAM

## 2024-10-31 PROCEDURE — 99214 OFFICE O/P EST MOD 30 MIN: CPT | Mod: 95 | Performed by: STUDENT IN AN ORGANIZED HEALTH CARE EDUCATION/TRAINING PROGRAM

## 2024-10-31 NOTE — PATIENT INSTRUCTIONS
Vaccine counseling: Given the patient's underlying autoimmune condition I recommend:  -Annual seasonal influenza vaccine   -Pneumococcal vaccination (PCV 20)  -Shingles vaccination (shingrix)   -COVID vaccine series including latest booster       Increase MMF to two pills in the morning and one at night and repeat labs in two weeks.

## 2024-11-01 ENCOUNTER — LAB (OUTPATIENT)
Dept: LAB | Facility: CLINIC | Age: 44
End: 2024-11-01
Payer: COMMERCIAL

## 2024-11-01 DIAGNOSIS — M31.0 LEUKOCYTOCLASTIC VASCULITIS (H): ICD-10-CM

## 2024-11-01 LAB
ALBUMIN UR-MCNC: 100 MG/DL
APPEARANCE UR: CLEAR
BACTERIA #/AREA URNS HPF: ABNORMAL /HPF
BASOPHILS # BLD AUTO: 0 10E3/UL (ref 0–0.2)
BASOPHILS NFR BLD AUTO: 0 %
BILIRUB UR QL STRIP: ABNORMAL
COLOR UR AUTO: YELLOW
EOSINOPHIL # BLD AUTO: 0.1 10E3/UL (ref 0–0.7)
EOSINOPHIL NFR BLD AUTO: 1 %
ERYTHROCYTE [DISTWIDTH] IN BLOOD BY AUTOMATED COUNT: 13 % (ref 10–15)
GLUCOSE UR STRIP-MCNC: NEGATIVE MG/DL
HCT VFR BLD AUTO: 48 % (ref 40–53)
HGB BLD-MCNC: 15.7 G/DL (ref 13.3–17.7)
HGB UR QL STRIP: ABNORMAL
IMM GRANULOCYTES # BLD: 0 10E3/UL
IMM GRANULOCYTES NFR BLD: 0 %
KETONES UR STRIP-MCNC: NEGATIVE MG/DL
LEUKOCYTE ESTERASE UR QL STRIP: NEGATIVE
LYMPHOCYTES # BLD AUTO: 2 10E3/UL (ref 0.8–5.3)
LYMPHOCYTES NFR BLD AUTO: 22 %
MCH RBC QN AUTO: 30.6 PG (ref 26.5–33)
MCHC RBC AUTO-ENTMCNC: 32.7 G/DL (ref 31.5–36.5)
MCV RBC AUTO: 94 FL (ref 78–100)
MONOCYTES # BLD AUTO: 0.7 10E3/UL (ref 0–1.3)
MONOCYTES NFR BLD AUTO: 8 %
MUCOUS THREADS #/AREA URNS LPF: PRESENT /LPF
NEUTROPHILS # BLD AUTO: 6.2 10E3/UL (ref 1.6–8.3)
NEUTROPHILS NFR BLD AUTO: 69 %
NITRATE UR QL: NEGATIVE
PH UR STRIP: 5.5 [PH] (ref 5–7)
PLATELET # BLD AUTO: 311 10E3/UL (ref 150–450)
RBC # BLD AUTO: 5.13 10E6/UL (ref 4.4–5.9)
RBC #/AREA URNS AUTO: ABNORMAL /HPF
SP GR UR STRIP: >=1.03 (ref 1–1.03)
UROBILINOGEN UR STRIP-ACNC: 1 E.U./DL
WBC # BLD AUTO: 8.9 10E3/UL (ref 4–11)
WBC #/AREA URNS AUTO: ABNORMAL /HPF

## 2024-11-01 PROCEDURE — 82570 ASSAY OF URINE CREATININE: CPT

## 2024-11-01 PROCEDURE — 83516 IMMUNOASSAY NONANTIBODY: CPT

## 2024-11-01 PROCEDURE — 80069 RENAL FUNCTION PANEL: CPT

## 2024-11-01 PROCEDURE — 36415 COLL VENOUS BLD VENIPUNCTURE: CPT

## 2024-11-01 PROCEDURE — 81001 URINALYSIS AUTO W/SCOPE: CPT

## 2024-11-01 PROCEDURE — 85025 COMPLETE CBC W/AUTO DIFF WBC: CPT

## 2024-11-01 PROCEDURE — 83876 ASSAY MYELOPEROXIDASE: CPT

## 2024-11-01 PROCEDURE — 84156 ASSAY OF PROTEIN URINE: CPT

## 2024-11-01 PROCEDURE — 82043 UR ALBUMIN QUANTITATIVE: CPT

## 2024-11-01 PROCEDURE — 86036 ANCA SCREEN EACH ANTIBODY: CPT

## 2024-11-02 LAB
ALBUMIN MFR UR ELPH: 65.7 MG/DL
ALBUMIN SERPL BCG-MCNC: 4 G/DL (ref 3.5–5.2)
ANION GAP SERPL CALCULATED.3IONS-SCNC: 9 MMOL/L (ref 7–15)
BUN SERPL-MCNC: 11.3 MG/DL (ref 6–20)
CALCIUM SERPL-MCNC: 9 MG/DL (ref 8.8–10.4)
CHLORIDE SERPL-SCNC: 103 MMOL/L (ref 98–107)
CREAT SERPL-MCNC: 0.96 MG/DL (ref 0.67–1.17)
CREAT UR-MCNC: 396 MG/DL
CREAT UR-MCNC: 396 MG/DL
EGFRCR SERPLBLD CKD-EPI 2021: >90 ML/MIN/1.73M2
GLUCOSE SERPL-MCNC: 111 MG/DL (ref 70–99)
HCO3 SERPL-SCNC: 28 MMOL/L (ref 22–29)
MICROALBUMIN UR-MCNC: 294 MG/L
MICROALBUMIN/CREAT UR: 74.24 MG/G CR (ref 0–17)
PHOSPHATE SERPL-MCNC: 2.6 MG/DL (ref 2.5–4.5)
POTASSIUM SERPL-SCNC: 4.5 MMOL/L (ref 3.4–5.3)
PROT/CREAT 24H UR: 0.17 MG/MG CR (ref 0–0.2)
SODIUM SERPL-SCNC: 140 MMOL/L (ref 135–145)

## 2024-11-04 LAB
ANCA AB PATTERN SER IF-IMP: NORMAL
C-ANCA TITR SER IF: NORMAL {TITER}

## 2024-11-05 ENCOUNTER — PATIENT OUTREACH (OUTPATIENT)
Dept: NEPHROLOGY | Facility: CLINIC | Age: 44
End: 2024-11-05
Payer: COMMERCIAL

## 2024-11-05 LAB
MYELOPEROXIDASE AB SER IA-ACNC: 0.3 U/ML
MYELOPEROXIDASE AB SER IA-ACNC: NEGATIVE
PROTEINASE3 AB SER IA-ACNC: <1 U/ML
PROTEINASE3 AB SER IA-ACNC: NEGATIVE

## 2024-11-05 NOTE — PROGRESS NOTES
Reached out to patient via [employer] to support scheduling follow up with Dr. West or nephrology.    Yoly Gruber RN, BSN, PHN  [facility] Nephrology Nurse Navigator  [phone]

## 2024-11-14 DIAGNOSIS — J45.40 MODERATE PERSISTENT ASTHMA WITHOUT COMPLICATION: ICD-10-CM

## 2024-11-14 RX ORDER — ALBUTEROL SULFATE 90 UG/1
2 INHALANT RESPIRATORY (INHALATION) EVERY 4 HOURS PRN
Qty: 8.5 G | Refills: 1 | Status: SHIPPED | OUTPATIENT
Start: 2024-11-14

## 2024-11-15 ENCOUNTER — LAB (OUTPATIENT)
Dept: LAB | Facility: CLINIC | Age: 44
End: 2024-11-15
Payer: COMMERCIAL

## 2024-11-15 DIAGNOSIS — M31.8 SYSTEMIC VASCULITIS (H): ICD-10-CM

## 2024-11-15 DIAGNOSIS — I77.6 VASCULITIS (H): ICD-10-CM

## 2024-11-15 LAB
BASOPHILS # BLD AUTO: 0 10E3/UL (ref 0–0.2)
BASOPHILS NFR BLD AUTO: 0 %
EOSINOPHIL # BLD AUTO: 0.1 10E3/UL (ref 0–0.7)
EOSINOPHIL NFR BLD AUTO: 1 %
ERYTHROCYTE [DISTWIDTH] IN BLOOD BY AUTOMATED COUNT: 12.6 % (ref 10–15)
ERYTHROCYTE [SEDIMENTATION RATE] IN BLOOD BY WESTERGREN METHOD: 8 MM/HR (ref 0–15)
HCT VFR BLD AUTO: 47.5 % (ref 40–53)
HGB BLD-MCNC: 15.4 G/DL (ref 13.3–17.7)
IMM GRANULOCYTES # BLD: 0 10E3/UL
IMM GRANULOCYTES NFR BLD: 0 %
LYMPHOCYTES # BLD AUTO: 1.7 10E3/UL (ref 0.8–5.3)
LYMPHOCYTES NFR BLD AUTO: 22 %
MCH RBC QN AUTO: 30 PG (ref 26.5–33)
MCHC RBC AUTO-ENTMCNC: 32.4 G/DL (ref 31.5–36.5)
MCV RBC AUTO: 93 FL (ref 78–100)
MONOCYTES # BLD AUTO: 0.7 10E3/UL (ref 0–1.3)
MONOCYTES NFR BLD AUTO: 9 %
NEUTROPHILS # BLD AUTO: 5.3 10E3/UL (ref 1.6–8.3)
NEUTROPHILS NFR BLD AUTO: 68 %
PLATELET # BLD AUTO: 306 10E3/UL (ref 150–450)
RBC # BLD AUTO: 5.13 10E6/UL (ref 4.4–5.9)
WBC # BLD AUTO: 7.7 10E3/UL (ref 4–11)

## 2024-11-15 PROCEDURE — 84460 ALANINE AMINO (ALT) (SGPT): CPT

## 2024-11-15 PROCEDURE — 84450 TRANSFERASE (AST) (SGOT): CPT

## 2024-11-15 PROCEDURE — 82565 ASSAY OF CREATININE: CPT

## 2024-11-15 PROCEDURE — 86140 C-REACTIVE PROTEIN: CPT

## 2024-11-15 PROCEDURE — 36415 COLL VENOUS BLD VENIPUNCTURE: CPT

## 2024-11-15 PROCEDURE — 85025 COMPLETE CBC W/AUTO DIFF WBC: CPT

## 2024-11-15 PROCEDURE — 85652 RBC SED RATE AUTOMATED: CPT

## 2024-11-16 LAB
ALT SERPL W P-5'-P-CCNC: 52 U/L (ref 0–70)
AST SERPL W P-5'-P-CCNC: 27 U/L (ref 0–45)
CREAT SERPL-MCNC: 1.17 MG/DL (ref 0.67–1.17)
CRP SERPL-MCNC: 8.45 MG/L
EGFRCR SERPLBLD CKD-EPI 2021: 78 ML/MIN/1.73M2

## 2024-11-18 DIAGNOSIS — M31.8 SYSTEMIC VASCULITIS (H): ICD-10-CM

## 2024-11-18 RX ORDER — MYCOPHENOLATE MOFETIL 500 MG/1
1000 TABLET ORAL 2 TIMES DAILY
Qty: 180 TABLET | Refills: 1 | Status: SHIPPED | OUTPATIENT
Start: 2024-11-18

## 2024-11-25 ENCOUNTER — TELEPHONE (OUTPATIENT)
Dept: NEPHROLOGY | Facility: CLINIC | Age: 44
End: 2024-11-25
Payer: COMMERCIAL

## 2024-11-25 NOTE — TELEPHONE ENCOUNTER
Left Voicemail (1st Attempt) and Sent Mychart (1st Attempt) for the patient to call back and schedule the following:    Appointment type: Return Glomerular  Provider: Dr. West  Return date: 12/13 or 12/17 held slots  Specialty phone number: [phone]  Additional appointment(s) needed: Labs prior  Additonal Notes: slot per Charlotte BOLTON

## 2024-11-26 ENCOUNTER — TELEPHONE (OUTPATIENT)
Dept: RHEUMATOLOGY | Facility: CLINIC | Age: 44
End: 2024-11-26
Payer: COMMERCIAL

## 2024-11-26 NOTE — TELEPHONE ENCOUNTER
Needs labs around 12/9.     Called patient to discuss mychart message that was sent 11/22 from provider that was not yet read.

## 2024-11-27 ENCOUNTER — TELEPHONE (OUTPATIENT)
Dept: NEPHROLOGY | Facility: CLINIC | Age: 44
End: 2024-11-27
Payer: COMMERCIAL

## 2024-11-27 NOTE — TELEPHONE ENCOUNTER
Sent Mychart (1st Attempt) and Left Voicemail (2nd Attempt) for the patient to call back and schedule the following:    Appointment type: Return Glomerular  Provider: Dr. West  Return date: 12/12 held slot  Specialty phone number: [phone]  Additional appointment(s) needed: Labs prior  Additonal Notes: slot per Charlotte BOLTON

## 2024-12-03 ENCOUNTER — PATIENT OUTREACH (OUTPATIENT)
Dept: NEPHROLOGY | Facility: CLINIC | Age: 44
End: 2024-12-03
Payer: COMMERCIAL

## 2024-12-05 NOTE — PROGRESS NOTES
Reached out to patient to attempt to re-establish care with Nephrology team to assess kidney status and HTN concerns.  Voicemail and MyChart sent to see if he is available 12/17/24 at 2pm with labs up to 7 days before appt.  Yoly Gruber RN, BSN, PHN  [facility] Nephrology Nurse Navigator  [phone]

## 2024-12-09 ENCOUNTER — PATIENT OUTREACH (OUTPATIENT)
Dept: NEPHROLOGY | Facility: CLINIC | Age: 44
End: 2024-12-09
Payer: COMMERCIAL

## 2024-12-09 NOTE — PROGRESS NOTES
Voicemail and MyChart sent to schedule follow up- last attempt to recheck kidney status.  Encouraged to reply this week or will filled reserved 12/17 appt with wait list.  Pending Vasculitis kidney labs for appt if needed.  Yoly Gruber RN, BSN, PHN  [facility] Nephrology Nurse Navigator  [phone]

## 2024-12-11 NOTE — PROGRESS NOTES
Antonella sent to patient to reach out to [facility] nurse line when he has a moment to schedule followup.  Removing hold for appt next week to allow others to be rescheduled.  Yoly Gruber RN, BSN, PHN  [facility] Nephrology Nurse Navigator  [phone]

## 2025-01-11 ENCOUNTER — MYC REFILL (OUTPATIENT)
Dept: FAMILY MEDICINE | Facility: CLINIC | Age: 45
End: 2025-01-11
Payer: COMMERCIAL

## 2025-01-11 DIAGNOSIS — J45.40 MODERATE PERSISTENT ASTHMA WITHOUT COMPLICATION: ICD-10-CM

## 2025-01-13 RX ORDER — ALBUTEROL SULFATE 90 UG/1
2 INHALANT RESPIRATORY (INHALATION) EVERY 4 HOURS PRN
Qty: 8.5 G | Refills: 1 | Status: SHIPPED | OUTPATIENT
Start: 2025-01-13

## 2025-01-16 ENCOUNTER — TELEPHONE (OUTPATIENT)
Dept: PHARMACY | Facility: OTHER | Age: 45
End: 2025-01-16
Payer: COMMERCIAL

## 2025-01-16 NOTE — TELEPHONE ENCOUNTER
Pt is due for follow up with Marshal FORD     Call placed to pt to initiate scheduling on 1/16/25    Outcome: LVM and MyC    *Next follow up outreach attempt will be on/around February 17th as third attempt*

## 2025-02-15 DIAGNOSIS — J45.40 MODERATE PERSISTENT ASTHMA WITHOUT COMPLICATION: ICD-10-CM

## 2025-02-16 ENCOUNTER — TELEPHONE (OUTPATIENT)
Dept: LAB | Facility: CLINIC | Age: 45
End: 2025-02-16
Payer: COMMERCIAL

## 2025-02-16 DIAGNOSIS — M31.8 SYSTEMIC VASCULITIS (H): ICD-10-CM

## 2025-02-17 RX ORDER — ALBUTEROL SULFATE 90 UG/1
2 INHALANT RESPIRATORY (INHALATION) EVERY 4 HOURS PRN
Qty: 8.5 G | Refills: 1 | Status: SHIPPED | OUTPATIENT
Start: 2025-02-17

## 2025-02-27 RX ORDER — MYCOPHENOLATE MOFETIL 500 MG/1
1000 TABLET ORAL 2 TIMES DAILY
Qty: 180 TABLET | Refills: 1 | OUTPATIENT
Start: 2025-02-27

## 2025-03-11 ENCOUNTER — MYC REFILL (OUTPATIENT)
Dept: FAMILY MEDICINE | Facility: CLINIC | Age: 45
End: 2025-03-11
Payer: COMMERCIAL

## 2025-03-11 DIAGNOSIS — J45.40 MODERATE PERSISTENT ASTHMA WITHOUT COMPLICATION: ICD-10-CM

## 2025-03-11 RX ORDER — ALBUTEROL SULFATE 90 UG/1
2 INHALANT RESPIRATORY (INHALATION) EVERY 4 HOURS PRN
Qty: 8.5 G | Refills: 1 | Status: SHIPPED | OUTPATIENT
Start: 2025-03-11

## 2025-05-22 ENCOUNTER — MYC REFILL (OUTPATIENT)
Dept: FAMILY MEDICINE | Facility: CLINIC | Age: 45
End: 2025-05-22
Payer: COMMERCIAL

## 2025-05-22 DIAGNOSIS — J45.40 MODERATE PERSISTENT ASTHMA WITHOUT COMPLICATION: ICD-10-CM

## 2025-05-22 RX ORDER — ALBUTEROL SULFATE 90 UG/1
2 INHALANT RESPIRATORY (INHALATION) EVERY 4 HOURS PRN
Qty: 8.5 G | Refills: 1 | OUTPATIENT
Start: 2025-05-22

## 2025-05-22 RX ORDER — ALBUTEROL SULFATE 90 UG/1
2 INHALANT RESPIRATORY (INHALATION) EVERY 4 HOURS PRN
Qty: 8.5 G | Refills: 1 | Status: SHIPPED | OUTPATIENT
Start: 2025-05-22

## 2025-05-22 NOTE — TELEPHONE ENCOUNTER
Patient calling in to clarify- he is not having any wheezing or shortness of breath right now.     He is trying to refill prescription so he can have it on hand in case he develops symptoms in the future.       Thanks,   Lyn Schultz RN

## 2025-06-21 DIAGNOSIS — J45.40 MODERATE PERSISTENT ASTHMA WITHOUT COMPLICATION: ICD-10-CM

## 2025-06-23 RX ORDER — ALBUTEROL SULFATE 90 UG/1
2 INHALANT RESPIRATORY (INHALATION) EVERY 4 HOURS PRN
Qty: 8.5 G | Refills: 1 | Status: SHIPPED | OUTPATIENT
Start: 2025-06-23

## 2025-08-12 ENCOUNTER — PATIENT OUTREACH (OUTPATIENT)
Dept: CARE COORDINATION | Facility: CLINIC | Age: 45
End: 2025-08-12
Payer: COMMERCIAL

## 2025-08-29 DIAGNOSIS — J45.40 MODERATE PERSISTENT ASTHMA WITHOUT COMPLICATION: ICD-10-CM

## 2025-08-30 RX ORDER — ALBUTEROL SULFATE 90 UG/1
2 INHALANT RESPIRATORY (INHALATION) EVERY 4 HOURS PRN
Qty: 8.5 G | Refills: 0 | Status: SHIPPED | OUTPATIENT
Start: 2025-08-30